# Patient Record
Sex: MALE | Race: WHITE | NOT HISPANIC OR LATINO | Employment: FULL TIME | URBAN - METROPOLITAN AREA
[De-identification: names, ages, dates, MRNs, and addresses within clinical notes are randomized per-mention and may not be internally consistent; named-entity substitution may affect disease eponyms.]

---

## 2017-02-28 ENCOUNTER — TELEPHONE (OUTPATIENT)
Dept: MEDICAL GROUP | Facility: MEDICAL CENTER | Age: 62
End: 2017-02-28

## 2017-02-28 NOTE — TELEPHONE ENCOUNTER
Phone Number Called: [phone] (home) [phone] (work)      Message: left message for patient to call back, patient did not leave reason for call    Left Message for patient to call back: yes

## 2017-03-01 RX ORDER — LEVOTHYROXINE SODIUM 88 UG/1
88 TABLET ORAL
Qty: 90 TAB | Refills: 3 | Status: SHIPPED | OUTPATIENT
Start: 2017-03-01 | End: 2022-01-01

## 2017-03-22 ENCOUNTER — TELEPHONE (OUTPATIENT)
Dept: MEDICAL GROUP | Facility: MEDICAL CENTER | Age: 62
End: 2017-03-22

## 2017-03-22 DIAGNOSIS — E78.5 HYPERLIPIDEMIA, UNSPECIFIED HYPERLIPIDEMIA TYPE: ICD-10-CM

## 2017-03-22 DIAGNOSIS — E87.6 HYPOKALEMIA: ICD-10-CM

## 2017-03-22 DIAGNOSIS — E03.9 HYPOTHYROIDISM, UNSPECIFIED TYPE: ICD-10-CM

## 2017-03-22 NOTE — TELEPHONE ENCOUNTER
1. Caller Name: Ramakrishna Ahuja                                  Call Back Number: [phone] (home) [phone] (work)        Patient approves a detailed voicemail message: N\A    Patient wanted to know if he needs any labs for his up coming appointment on 5/2017 ? Thank you Please advise

## 2017-03-23 NOTE — TELEPHONE ENCOUNTER
VOICEMAIL  1. Caller Name: Nurys                      Call Back Number: [phone]    2. Message: Left VM for the patient to call back to give doctor message regarding labs.    3. Patient approves office to leave a detailed voicemail/MyChart message: N\A

## 2017-03-28 NOTE — TELEPHONE ENCOUNTER
VOICEMAIL  1. Caller Name: Nurys                      Call Back Number: [phone]    2. Message: Left another VM for the patient to call back to give him the doctors message regarding labs, I have not had any response from the patient, I will try calling again later.    3. Patient approves office to leave a detailed voicemail/MyChart message: N\A

## 2017-03-31 NOTE — TELEPHONE ENCOUNTER
Phone Number Called: [phone]    Message: I have left several VM for the patient to call back to give doctor message regarding patients lab and to call back with any questions, I have had no responce no response from the patient.    Left Message for patient to call back: N\A

## 2017-05-03 ENCOUNTER — HOSPITAL ENCOUNTER (OUTPATIENT)
Dept: LAB | Facility: MEDICAL CENTER | Age: 62
End: 2017-05-03
Attending: INTERNAL MEDICINE
Payer: COMMERCIAL

## 2017-05-03 DIAGNOSIS — E87.6 HYPOKALEMIA: ICD-10-CM

## 2017-05-03 DIAGNOSIS — E03.9 HYPOTHYROIDISM, UNSPECIFIED TYPE: ICD-10-CM

## 2017-05-03 DIAGNOSIS — E78.5 HYPERLIPIDEMIA, UNSPECIFIED HYPERLIPIDEMIA TYPE: ICD-10-CM

## 2017-05-03 LAB
ANION GAP SERPL CALC-SCNC: 12 MMOL/L (ref 0–11.9)
BUN SERPL-MCNC: 16 MG/DL (ref 8–22)
CALCIUM SERPL-MCNC: 9.4 MG/DL (ref 8.5–10.5)
CHLORIDE SERPL-SCNC: 103 MMOL/L (ref 96–112)
CHOLEST SERPL-MCNC: 235 MG/DL (ref 100–199)
CO2 SERPL-SCNC: 29 MMOL/L (ref 20–33)
CREAT SERPL-MCNC: 1.15 MG/DL (ref 0.5–1.4)
GFR SERPL CREATININE-BSD FRML MDRD: >60 ML/MIN/1.73 M 2
GLUCOSE SERPL-MCNC: 99 MG/DL (ref 65–99)
HDLC SERPL-MCNC: 52 MG/DL
LDLC SERPL CALC-MCNC: 156 MG/DL
POTASSIUM SERPL-SCNC: 3.4 MMOL/L (ref 3.6–5.5)
SODIUM SERPL-SCNC: 144 MMOL/L (ref 135–145)
TRIGL SERPL-MCNC: 137 MG/DL (ref 0–149)
TSH SERPL DL<=0.005 MIU/L-ACNC: 2.38 UIU/ML (ref 0.3–3.7)

## 2017-05-03 PROCEDURE — 80061 LIPID PANEL: CPT

## 2017-05-03 PROCEDURE — 80048 BASIC METABOLIC PNL TOTAL CA: CPT

## 2017-05-03 PROCEDURE — 84443 ASSAY THYROID STIM HORMONE: CPT

## 2017-05-03 PROCEDURE — 36415 COLL VENOUS BLD VENIPUNCTURE: CPT

## 2017-05-05 ENCOUNTER — TELEPHONE (OUTPATIENT)
Dept: MEDICAL GROUP | Facility: MEDICAL CENTER | Age: 62
End: 2017-05-05

## 2017-05-05 NOTE — TELEPHONE ENCOUNTER
Future Appointments      Provider Department Center   5/8/2017 9:40 AM Teddy Villela M.D. [facility] [address]       ESTABLISHED PATIENT PRE-VISIT PLANNING     Note: Patient will not be contacted if there is no indication to call. PT was not Contacted.    1.    Reviewed note from last office visit with PCP: YES Last office visit: 11/29/16    2.  If any orders were placed at last visit, do we have Results/Consult Notes?        •  Labs - Labs ordered, completed and results are in chart. 05/03/17       •  Imaging - Imaging was not ordered at last office visit.        •  Referrals - No referrals were ordered at last office visit.     3.  Immunizations were updated in Epic using WebIZ?: No WebIZ record    4.  Patient is due for the following Health Maintenance Topics:   Health Maintenance Due   Topic Date Due   • IMM DTaP/Tdap/Td Vaccine (1 - Tdap) DUE   • COLONOSCOPY  DUE   • IMM ZOSTER VACCINE  DUE       5.  Patient was not informed to arrive 15 min prior to their scheduled appointment and bring in their medication bottles.

## 2017-05-08 ENCOUNTER — OFFICE VISIT (OUTPATIENT)
Dept: MEDICAL GROUP | Facility: MEDICAL CENTER | Age: 62
End: 2017-05-08
Payer: COMMERCIAL

## 2017-05-08 VITALS
TEMPERATURE: 98.6 F | HEART RATE: 84 BPM | WEIGHT: 180 LBS | DIASTOLIC BLOOD PRESSURE: 80 MMHG | HEIGHT: 70 IN | BODY MASS INDEX: 25.77 KG/M2 | RESPIRATION RATE: 16 BRPM | SYSTOLIC BLOOD PRESSURE: 126 MMHG | OXYGEN SATURATION: 94 %

## 2017-05-08 DIAGNOSIS — N52.9 ERECTILE DYSFUNCTION, UNSPECIFIED ERECTILE DYSFUNCTION TYPE: ICD-10-CM

## 2017-05-08 DIAGNOSIS — R53.83 FATIGUE, UNSPECIFIED TYPE: ICD-10-CM

## 2017-05-08 DIAGNOSIS — E78.5 HYPERLIPIDEMIA, UNSPECIFIED HYPERLIPIDEMIA TYPE: ICD-10-CM

## 2017-05-08 DIAGNOSIS — R07.9 CHEST PAIN, UNSPECIFIED TYPE: ICD-10-CM

## 2017-05-08 DIAGNOSIS — E87.6 HYPOKALEMIA: ICD-10-CM

## 2017-05-08 DIAGNOSIS — Z79.899 LONG TERM USE OF DRUG: ICD-10-CM

## 2017-05-08 DIAGNOSIS — E03.9 HYPOTHYROIDISM, UNSPECIFIED TYPE: ICD-10-CM

## 2017-05-08 PROCEDURE — 99214 OFFICE O/P EST MOD 30 MIN: CPT | Performed by: INTERNAL MEDICINE

## 2017-05-08 RX ORDER — PRAVASTATIN SODIUM 20 MG
20 TABLET ORAL EVERY EVENING
Qty: 90 TAB | Refills: 3 | Status: SHIPPED | OUTPATIENT
Start: 2017-05-08 | End: 2020-01-07

## 2017-05-08 NOTE — MR AVS SNAPSHOT
"        Ramakrishna Ahuja   2017 9:40 AM   Office Visit   MRN: [id number]    Department:  [address] [place] [address]   Dept Phone:  [phone]    Description:  Male : 1955   Provider:  Teddy Villela M.D.           Reason for Visit     Medication Management f/u thyroid      Allergies as of 2017     Allergen Noted Reactions    Penicillins 2008         You were diagnosed with     Hyperlipidemia, unspecified hyperlipidemia type   [9655797]       Long term use of drug   [933329]         Vital Signs     Blood Pressure Pulse Temperature Respirations Height Weight    126/80 mmHg 84 37 °C (98.6 °F) 16 1.778 m (5' 10\") 81.647 kg (180 lb)    Body Mass Index Oxygen Saturation Smoking Status             25.83 kg/m2 94% Never Smoker          Basic Information     Date Of Birth Sex Race Ethnicity Preferred Language    1955 Male White Non- English      Your appointments     Aug 29, 2017 10:00 AM   ANNUAL EXAM PREVENTATIVE with Teddy Villela M.D.   [facility] [address]    [address] [facility] [address]  [facility] [address]   [phone]              Problem List              ICD-10-CM Priority Class Noted - Resolved    Chest pain R07.9   2012 - Present    Abnormal ECG R94.31   2012 - Present    Bilateral inguinal hernia K40.20   2013 - Present    Erectile dysfunction N52.9   2016 - Present    Fatigue R53.83   2016 - Present    Routine general medical examination at a health care facility Z00.00   2016 - Present    Hypokalemia E87.6   2016 - Present    Hypothyroidism E03.9   2016 - Present    Hyperlipidemia E78.5   2016 - Present      Health Maintenance        Date Due Completion Dates    IMM DTaP/Tdap/Td Vaccine (1 - Tdap) 1974 ---    COLONOSCOPY 2005 ---    IMM ZOSTER VACCINE 2015 ---            Current Immunizations     No immunizations on file.      Below and/or attached are the medications your provider expects you " to take. Review all of your home medications and newly ordered medications with your provider and/or pharmacist. Follow medication instructions as directed by your provider and/or pharmacist. Please keep your medication list with you and share with your provider. Update the information when medications are discontinued, doses are changed, or new medications (including over-the-counter products) are added; and carry medication information at all times in the event of emergency situations     Allergies:  PENICILLINS - (reactions not documented)               Medications  Valid as of: May 08, 2017 - 10:15 AM    Generic Name Brand Name Tablet Size Instructions for use    Ascorbic Acid (Cap) Vitamin C 500 MG Take  by mouth.        Aspirin (Tab) aspirin 81 MG QDAY.         Levothyroxine Sodium (Tab) SYNTHROID 88 MCG Take 1 Tab by mouth Every morning on an empty stomach.        Metaxalone (Tab) SKELAXIN 800 MG Take 1 Tab by mouth 3 times a day as needed. Do not take at work/driving/operating machinery if any side-effects.        MethylPREDNISolone (Kit) MEDROL DOSEPACK 4 MG As directed        Misc Natural Products (Tab) COMPLETE PROSTATE HEALTH  Take  by mouth.        Multiple Vitamin (Cap) multivitamin  Take  by mouth.        Pravastatin Sodium (Tab) PRAVACHOL 20 MG Take 1 Tab by mouth every evening.        Sildenafil Citrate (Tab) VIAGRA 50 MG Take 1 Tab by mouth as needed for Erectile Dysfunction.        .                 Medicines prescribed today were sent to:     [id number] [place] - [address] [place] [id number]    Phone: [phone] Fax: [phone]    Open 24 Hours?: No      Medication refill instructions:       If your prescription bottle indicates you have medication refills left, it is not necessary to call your provider’s office. Please contact your pharmacy and they will refill your medication.    If your prescription bottle indicates you do not have any  refills left, you may request refills at any time through one of the following ways: The online [employer] system (except Urgent Care), by calling your provider’s office, or by asking your pharmacy to contact your provider’s office with a refill request. Medication refills are processed only during regular business hours and may not be available until the next business day. Your provider may request additional information or to have a follow-up visit with you prior to refilling your medication.   *Please Note: Medication refills are assigned a new Rx number when refilled electronically. Your pharmacy may indicate that no refills were authorized even though a new prescription for the same medication is available at the pharmacy. Please request the medicine by name with the pharmacy before contacting your provider for a refill.        Your To Do List     Future Labs/Procedures Complete By Expires    HEPATIC FUNCTION PANEL  As directed 5/8/2018    LIPID PROFILE  As directed 5/9/2018         [employer] Status: Patient Declined

## 2017-05-08 NOTE — ASSESSMENT & PLAN NOTE
Most recent cholesterol 235, triglycerides 137, HDL 52, . 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

## 2017-05-08 NOTE — ASSESSMENT & PLAN NOTE
He continues levothyroxine without difficulty. TSH is normal at 2.3. He does have fatigue as noted. He otherwise is euthyroid.

## 2017-05-08 NOTE — ASSESSMENT & PLAN NOTE
His fatigue is about the same which he attributes to being under a lot of stress and working long hard hours.

## 2017-05-08 NOTE — ASSESSMENT & PLAN NOTE
A friend gave him some samples of 20 mg sildenafil. He has not tried taking them yet. He did not get the prescription for Viagra.

## 2017-05-08 NOTE — PROGRESS NOTES
Subjective:     Chief Complaint   Patient presents with   • Medication Management     f/u thyroid     Ramakrishna Ahuja is a 62 y.o. male here today for follow-up of his various health problems. Remains under a large amount of stress and he works long hours.    Chest pain  He denies recent chest pain.    Erectile dysfunction  A friend gave him some samples of 20 mg sildenafil. He has not tried taking them yet. He did not get the prescription for Viagra.    Fatigue  His fatigue is about the same which he attributes to being under a lot of stress and working long hard hours.    Hyperlipidemia  Most recent cholesterol 235, triglycerides 137, HDL 52, . 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    Hypokalemia  We reviewed foods high in potassium and he was encouraged in a low-salt diet.    Hypothyroidism  He continues levothyroxine without difficulty. TSH is normal at 2.3. He does have fatigue as noted. He otherwise is euthyroid.       Diagnoses of Hyperlipidemia, unspecified hyperlipidemia type, Long term use of drug, Erectile dysfunction, unspecified erectile dysfunction type, Fatigue, unspecified type, Hypothyroidism, unspecified type, Chest pain, unspecified type, and Hypokalemia were pertinent to this visit.    Allergies: Penicillins  Current medicines (including changes today)  Current Outpatient Prescriptions   Medication Sig Dispense Refill   • pravastatin (PRAVACHOL) 20 MG Tab Take 1 Tab by mouth every evening. 90 Tab 3   • levothyroxine (SYNTHROID) 88 MCG Tab Take 1 Tab by mouth Every morning on an empty stomach. 90 Tab 3   • Misc Natural Products (COMPLETE PROSTATE HEALTH) TABS Take  by mouth.     • Multiple Vitamin (MULTIVITAMIN) capsule Take  by mouth.     • Ascorbic Acid (VITAMIN C) 500 MG CAPS Take  by mouth.     • ASPIRIN 81 MG PO TABS QDAY.      • sildenafil citrate (VIAGRA) 50 MG tablet Take 1 Tab by mouth as needed for Erectile Dysfunction. 6 Tab 11   •  "methylPREDNISolone (MEDROL, MILY,) 4 MG tablet As directed 1 Kit 0   • metaxalone (SKELAXIN) 800 MG TABS Take 1 Tab by mouth 3 times a day as needed. Do not take at work/driving/operating machinery if any side-effects. 30 Tab 1     No current facility-administered medications for this visit.       He  has a past medical history of Murmur (1978); Angina; and Alcohol use.    ROS    Patient denies significant change in strength, weight or appetite.  No significant lightheadedness or headaches.  No change in vision, hearing, or swallowing.  No new dyspnea, coughing, chest pain, or palpitations.  No indigestion, abdominal pain, or change in bowel habits.  No change in urinating.  No new ankle swelling.       Objective:     PE:  /80 mmHg  Pulse 84  Temp(Src) 37 °C (98.6 °F)  Resp 16  Ht 1.778 m (5' 10\")  Wt 81.647 kg (180 lb)  BMI 25.83 kg/m2  SpO2 94%  Neck is supple without significant lymphadenopathy or masses.  Lungs are clear with normal breath sounds without wheezes or rales .  Cardiovascular: peripheral circulation is satisfactory, heart sounds are unchanged and unremarkable.  Abdomen is soft, without masses or tenderness, with normal bowel sounds.  Extremities are without significant edema, cyanosis or deformity.      Assessment and Plan:   The following treatment plan was discussed  1. Hyperlipidemia, unspecified hyperlipidemia type  LIPID PROFILE    We reviewed appropriate diet. We agreed to start him on pravastatin. We reviewed potential side effects.   2. Long term use of drug  HEPATIC FUNCTION PANEL   3. Erectile dysfunction, unspecified erectile dysfunction type      We again discussed conservative therapy of erectile dysfunction. We reviewed appropriate use of Viagra.   4. Fatigue, unspecified type      Probably closely related to his stress and long work hours.   5. Hypothyroidism, unspecified type      He will continue levothyroxin. We will check TSH at least yearly.   6. Chest pain, " unspecified type      He will report any significant further episodes of chest discomfort.   7. Hypokalemia      Low-salt diet and foods high in potassium.       Followup: Return in about 3 months (around 8/8/2017), or H&P, for Long.

## 2019-12-30 ENCOUNTER — TELEPHONE (OUTPATIENT)
Dept: MEDICAL GROUP | Facility: MEDICAL CENTER | Age: 64
End: 2019-12-30

## 2019-12-30 NOTE — TELEPHONE ENCOUNTER
Pt wanting a referral for a knee doctor but hasn't been seen since 2017. Can we reestablish him as a new patient?

## 2020-01-07 ENCOUNTER — OFFICE VISIT (OUTPATIENT)
Dept: MEDICAL GROUP | Facility: MEDICAL CENTER | Age: 65
End: 2020-01-07
Payer: COMMERCIAL

## 2020-01-07 VITALS
TEMPERATURE: 98.6 F | HEIGHT: 73 IN | WEIGHT: 174 LBS | DIASTOLIC BLOOD PRESSURE: 78 MMHG | HEART RATE: 101 BPM | OXYGEN SATURATION: 94 % | SYSTOLIC BLOOD PRESSURE: 130 MMHG | BODY MASS INDEX: 23.06 KG/M2

## 2020-01-07 DIAGNOSIS — M25.561 CHRONIC PAIN OF BOTH KNEES: ICD-10-CM

## 2020-01-07 DIAGNOSIS — G89.29 CHRONIC PAIN OF BOTH KNEES: ICD-10-CM

## 2020-01-07 DIAGNOSIS — E03.9 HYPOTHYROIDISM, UNSPECIFIED TYPE: ICD-10-CM

## 2020-01-07 DIAGNOSIS — E78.5 HYPERLIPIDEMIA, UNSPECIFIED HYPERLIPIDEMIA TYPE: ICD-10-CM

## 2020-01-07 DIAGNOSIS — M25.562 CHRONIC PAIN OF BOTH KNEES: ICD-10-CM

## 2020-01-07 PROCEDURE — 99213 OFFICE O/P EST LOW 20 MIN: CPT | Performed by: INTERNAL MEDICINE

## 2020-01-07 RX ORDER — PRAVASTATIN SODIUM 20 MG
20 TABLET ORAL EVERY EVENING
Qty: 90 TAB | Refills: 3 | Status: SHIPPED
Start: 2020-01-07 | End: 2020-01-07

## 2020-01-07 RX ORDER — PRAVASTATIN SODIUM 20 MG
20 TABLET ORAL EVERY EVENING
Qty: 90 TAB | Refills: 3 | Status: SHIPPED | OUTPATIENT
Start: 2020-01-07 | End: 2022-01-01 | Stop reason: SDUPTHER

## 2020-01-07 ASSESSMENT — PATIENT HEALTH QUESTIONNAIRE - PHQ9: CLINICAL INTERPRETATION OF PHQ2 SCORE: 0

## 2020-01-07 NOTE — ASSESSMENT & PLAN NOTE
He reports that when he was taking his levothyroxine that he felt jittery.  He stopped the medication and reports that he feels much better.  He thinks his energy level is good.

## 2020-01-07 NOTE — ASSESSMENT & PLAN NOTE
He has hyperlipidemia for which he has been on pravastatin.  For reasons not clear he stopped that medication several months ago.  He tries to follow appropriate diet.  He has not had his lipids checked recently.

## 2020-01-07 NOTE — ASSESSMENT & PLAN NOTE
He has chronic pain in both knees that has been gradually progressive over several years.  He played a lot of sports in school and had some knee injuries.  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  That aggravated his knee problems quite significantly.  He reports swelling of both knees and occasional locking or snapping or clicking.  He has quite a bit of pain in both knees.  Aleve does seem to help that at least somewhat.

## 2020-01-07 NOTE — PROGRESS NOTES
Subjective:     Chief Complaint   Patient presents with   • Referral Needed     Knees, right leg torn miniscus, swollen, pockets on both sides, pain     Ramakrishna Ahuja is a 64 y.o. male here today for evaluation especially of bilateral knee pain that is worse on the right.  He also is due for further evaluation and treatment of hyperlipidemia and hypothyroidism.    Hypothyroidism  He reports that when he was taking his levothyroxine that he felt jittery.  He stopped the medication and reports that he feels much better.  He thinks his energy level is good.    Hyperlipidemia  He has hyperlipidemia for which he has been on pravastatin.  For reasons not clear he stopped that medication several months ago.  He tries to follow appropriate diet.  He has not had his lipids checked recently.    Chronic pain of both knees  He has chronic pain in both knees that has been gradually progressive over several years.  He played a lot of sports in school and had some knee injuries.  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  That aggravated his knee problems quite significantly.  He reports swelling of both knees and occasional locking or snapping or clicking.  He has quite a bit of pain in both knees.  Aleve does seem to help that at least somewhat.       Diagnoses of Chronic pain of both knees, Hypothyroidism, unspecified type, and Hyperlipidemia, unspecified hyperlipidemia type were pertinent to this visit.    Allergies: Penicillins  Current medicines (including changes today)  Current Outpatient Medications   Medication Sig Dispense Refill   • pravastatin (PRAVACHOL) 20 MG Tab Take 1 Tab by mouth every evening. 90 Tab 3   • Misc Natural Products (COMPLETE PROSTATE HEALTH) TABS Take  by mouth.     • Multiple Vitamin (MULTIVITAMIN) capsule Take  by mouth.     • Ascorbic Acid (VITAMIN C) 500 MG CAPS Take  by mouth.     • ASPIRIN 81 MG PO TABS QDAY.      • levothyroxine (SYNTHROID) 88 MCG Tab Take 1 Tab by  "mouth Every morning on an empty stomach. (Patient not taking: Reported on 1/7/2020) 90 Tab 3   • sildenafil citrate (VIAGRA) 50 MG tablet Take 1 Tab by mouth as needed for Erectile Dysfunction. (Patient not taking: Reported on 1/7/2020) 6 Tab 11   • methylPREDNISolone (MEDROL, MILY,) 4 MG tablet As directed (Patient not taking: Reported on 1/7/2020) 1 Kit 0   • metaxalone (SKELAXIN) 800 MG TABS Take 1 Tab by mouth 3 times a day as needed. Do not take at work/driving/operating machinery if any side-effects. (Patient not taking: Reported on 1/7/2020) 30 Tab 1     No current facility-administered medications for this visit.        He  has a past medical history of Alcohol use, Angina, and Murmur (1978).    ROS    Patient denies significant change in strength, weight or appetite.  No significant lightheadedness or headaches.  No change in vision, hearing, or swallowing.  No new dyspnea, coughing, chest pain, or palpitations.  No indigestion, abdominal pain, or change in bowel habits.  No change in urinating.  No new ankle swelling.       Objective:     PE:  /78 (BP Location: Left arm, Patient Position: Sitting)   Pulse (!) 101   Temp 37 °C (98.6 °F)   Ht 1.854 m (6' 1\")   Wt 78.9 kg (174 lb)   SpO2 94%   BMI 22.96 kg/m²   Abdomen is soft, without masses or tenderness, with normal bowel sounds.  Extremities are without significant edema, cyanosis or deformity.  He has bilateral knee effusions.  Ligaments seem intact.  I cannot elicit any locking or clicking or snapping at this time.      Assessment and Plan:   The following treatment plan was discussed  1. Chronic pain of both knees  REFERRAL TO ORTHOPEDICS    We reviewed exercises for his knees.  I put in a referral for orthopedics and we discussed options in treatment.   2. Hypothyroidism, unspecified type  TSH    We will check a TSH and evaluate or treat further as indicated.   3. Hyperlipidemia, unspecified hyperlipidemia type  Lipid Profile    He will " restart the pravastatin, then we will recheck a lipid panel.  We briefly reviewed appropriate diet.       Followup: Return in about 3 months (around 4/7/2020) for Short.

## 2020-02-27 ENCOUNTER — TELEPHONE (OUTPATIENT)
Dept: MEDICAL GROUP | Facility: MEDICAL CENTER | Age: 65
End: 2020-02-27

## 2020-02-27 NOTE — TELEPHONE ENCOUNTER
Pt called and said his knees are still hurting and is wondering if he can take anything over the counter for it as his cartilage is very thin.  He said he went to the orthopedic surgeon and they wanted to shoot him with stuff but he said he doesn't want to do that right now.

## 2020-02-28 NOTE — TELEPHONE ENCOUNTER
Message was left to try glucosamine sulfate for 1 to 2 weeks at least and otherwise he could try Aleve 2 pills twice a day as long as he is not taking an anti-inflammatory medication.

## 2020-03-25 ENCOUNTER — TELEPHONE (OUTPATIENT)
Dept: MEDICAL GROUP | Facility: MEDICAL CENTER | Age: 65
End: 2020-03-25

## 2020-03-26 ENCOUNTER — TELEPHONE (OUTPATIENT)
Dept: MEDICAL GROUP | Facility: MEDICAL CENTER | Age: 65
End: 2020-03-26

## 2020-03-26 DIAGNOSIS — M25.561 CHRONIC PAIN OF BOTH KNEES: ICD-10-CM

## 2020-03-26 DIAGNOSIS — G89.29 CHRONIC PAIN OF BOTH KNEES: ICD-10-CM

## 2020-03-26 DIAGNOSIS — M25.562 CHRONIC PAIN OF BOTH KNEES: ICD-10-CM

## 2020-03-26 NOTE — TELEPHONE ENCOUNTER
Spoke with patient and he mentioned he wants to see another office other then the [facility] and if you have a doctor you can refer him to he will go there also. He didn't care for the [facility]

## 2021-03-03 DIAGNOSIS — Z23 NEED FOR VACCINATION: ICD-10-CM

## 2022-01-01 ENCOUNTER — APPOINTMENT (OUTPATIENT)
Dept: CARDIOLOGY | Facility: MEDICAL CENTER | Age: 67
DRG: 215 | End: 2022-01-01
Attending: INTERNAL MEDICINE
Payer: MEDICARE

## 2022-01-01 ENCOUNTER — APPOINTMENT (OUTPATIENT)
Dept: RADIOLOGY | Facility: MEDICAL CENTER | Age: 67
DRG: 215 | End: 2022-01-01
Payer: MEDICARE

## 2022-01-01 ENCOUNTER — APPOINTMENT (OUTPATIENT)
Dept: MEDICAL GROUP | Facility: MEDICAL CENTER | Age: 67
End: 2022-01-01
Payer: MEDICARE

## 2022-01-01 ENCOUNTER — APPOINTMENT (OUTPATIENT)
Dept: CARDIOLOGY | Facility: MEDICAL CENTER | Age: 67
DRG: 215 | End: 2022-01-01
Attending: EMERGENCY MEDICINE
Payer: MEDICARE

## 2022-01-01 ENCOUNTER — APPOINTMENT (OUTPATIENT)
Dept: RADIOLOGY | Facility: MEDICAL CENTER | Age: 67
DRG: 215 | End: 2022-01-01
Attending: INTERNAL MEDICINE
Payer: MEDICARE

## 2022-01-01 ENCOUNTER — OFFICE VISIT (OUTPATIENT)
Dept: MEDICAL GROUP | Facility: MEDICAL CENTER | Age: 67
End: 2022-01-01
Payer: MEDICARE

## 2022-01-01 ENCOUNTER — APPOINTMENT (OUTPATIENT)
Dept: RADIOLOGY | Facility: MEDICAL CENTER | Age: 67
DRG: 215 | End: 2022-01-01
Attending: EMERGENCY MEDICINE
Payer: MEDICARE

## 2022-01-01 ENCOUNTER — TELEPHONE (OUTPATIENT)
Dept: HEALTH INFORMATION MANAGEMENT | Facility: OTHER | Age: 67
End: 2022-01-01
Payer: MEDICARE

## 2022-01-01 ENCOUNTER — HOSPITAL ENCOUNTER (INPATIENT)
Facility: MEDICAL CENTER | Age: 67
LOS: 1 days | DRG: 215 | End: 2022-12-30
Attending: EMERGENCY MEDICINE | Admitting: INTERNAL MEDICINE
Payer: MEDICARE

## 2022-01-01 VITALS
DIASTOLIC BLOOD PRESSURE: 51 MMHG | OXYGEN SATURATION: 78 % | BODY MASS INDEX: 25.4 KG/M2 | SYSTOLIC BLOOD PRESSURE: 73 MMHG | TEMPERATURE: 91 F | HEIGHT: 70 IN | RESPIRATION RATE: 32 BRPM

## 2022-01-01 VITALS
DIASTOLIC BLOOD PRESSURE: 86 MMHG | TEMPERATURE: 98.6 F | HEIGHT: 73 IN | HEART RATE: 97 BPM | WEIGHT: 177 LBS | OXYGEN SATURATION: 96 % | BODY MASS INDEX: 23.46 KG/M2 | SYSTOLIC BLOOD PRESSURE: 136 MMHG

## 2022-01-01 DIAGNOSIS — E03.9 HYPOTHYROIDISM, UNSPECIFIED TYPE: ICD-10-CM

## 2022-01-01 DIAGNOSIS — G89.29 CHRONIC PAIN OF BOTH KNEES: ICD-10-CM

## 2022-01-01 DIAGNOSIS — E78.5 HYPERLIPIDEMIA, UNSPECIFIED HYPERLIPIDEMIA TYPE: ICD-10-CM

## 2022-01-01 DIAGNOSIS — J96.01 ACUTE RESPIRATORY FAILURE WITH HYPOXIA (HCC): ICD-10-CM

## 2022-01-01 DIAGNOSIS — M25.562 CHRONIC PAIN OF BOTH KNEES: ICD-10-CM

## 2022-01-01 DIAGNOSIS — I21.3 ST ELEVATION MYOCARDIAL INFARCTION (STEMI), UNSPECIFIED ARTERY (HCC): ICD-10-CM

## 2022-01-01 DIAGNOSIS — M25.561 CHRONIC PAIN OF BOTH KNEES: ICD-10-CM

## 2022-01-01 DIAGNOSIS — R53.83 FATIGUE, UNSPECIFIED TYPE: ICD-10-CM

## 2022-01-01 DIAGNOSIS — Z12.5 PROSTATE CANCER SCREENING: ICD-10-CM

## 2022-01-01 DIAGNOSIS — E87.6 HYPOKALEMIA: ICD-10-CM

## 2022-01-01 DIAGNOSIS — I46.9 CARDIAC ARREST (HCC): ICD-10-CM

## 2022-01-01 DIAGNOSIS — R57.0 CARDIOGENIC SHOCK (HCC): ICD-10-CM

## 2022-01-01 LAB
ABO + RH BLD: NORMAL
ABO GROUP BLD: NORMAL
ACT BLD: 173 SEC (ref 74–137)
ACT BLD: 173 SEC (ref 74–137)
ACT BLD: 179 SEC (ref 74–137)
ACT BLD: 209 SEC (ref 74–137)
ACT BLD: 239 SEC (ref 74–137)
ACT BLD: 335 SEC (ref 74–137)
ALBUMIN SERPL BCP-MCNC: 1.6 G/DL (ref 3.2–4.9)
ALBUMIN SERPL BCP-MCNC: 2.5 G/DL (ref 3.2–4.9)
ALBUMIN SERPL BCP-MCNC: 3.5 G/DL (ref 3.2–4.9)
ALBUMIN SERPL BCP-MCNC: 4.1 G/DL (ref 3.2–4.9)
ALBUMIN/GLOB SERPL: 1 G/DL
ALBUMIN/GLOB SERPL: 1.3 G/DL
ALBUMIN/GLOB SERPL: 1.3 G/DL
ALBUMIN/GLOB SERPL: ABNORMAL G/DL
ALP SERPL-CCNC: 34 U/L (ref 30–99)
ALP SERPL-CCNC: 47 U/L (ref 30–99)
ALP SERPL-CCNC: 62 U/L (ref 30–99)
ALP SERPL-CCNC: 63 U/L (ref 30–99)
ALT SERPL-CCNC: 110 U/L (ref 2–50)
ALT SERPL-CCNC: 178 U/L (ref 2–50)
ALT SERPL-CCNC: 231 U/L (ref 2–50)
ALT SERPL-CCNC: 29 U/L (ref 2–50)
ANION GAP SERPL CALC-SCNC: 17 MMOL/L (ref 7–16)
ANION GAP SERPL CALC-SCNC: 18 MMOL/L (ref 7–16)
ANION GAP SERPL CALC-SCNC: 19 MMOL/L (ref 7–16)
ANION GAP SERPL CALC-SCNC: 26 MMOL/L (ref 7–16)
ANION GAP SERPL CALC-SCNC: 29 MMOL/L (ref 7–16)
APTT PPP: 146.8 SEC (ref 24.7–36)
APTT PPP: >240 SEC (ref 24.7–36)
APTT PPP: >240 SEC (ref 24.7–36)
AST SERPL-CCNC: 278 U/L (ref 12–45)
AST SERPL-CCNC: 285 U/L (ref 12–45)
AST SERPL-CCNC: 29 U/L (ref 12–45)
AST SERPL-CCNC: 437 U/L (ref 12–45)
BARCODED ABORH UBTYP: 2800
BARCODED ABORH UBTYP: 5100
BARCODED ABORH UBTYP: 6200
BARCODED ABORH UBTYP: 7300
BARCODED ABORH UBTYP: 9500
BARCODED PRD CODE UBPRD: NORMAL
BARCODED UNIT NUM UBUNT: NORMAL
BASE EXCESS BLDA CALC-SCNC: -14 MMOL/L (ref -4–3)
BASE EXCESS BLDA CALC-SCNC: -16 MMOL/L (ref -4–3)
BASE EXCESS BLDA CALC-SCNC: -18 MMOL/L (ref -4–3)
BASE EXCESS BLDA CALC-SCNC: -6 MMOL/L (ref -4–3)
BASE EXCESS BLDV CALC-SCNC: -20 MMOL/L (ref -4–3)
BASOPHILS # BLD AUTO: 0 % (ref 0–1.8)
BASOPHILS # BLD AUTO: 0.3 % (ref 0–1.8)
BASOPHILS # BLD AUTO: 0.5 % (ref 0–1.8)
BASOPHILS # BLD: 0 K/UL (ref 0–0.12)
BASOPHILS # BLD: 0.06 K/UL (ref 0–0.12)
BASOPHILS # BLD: 0.11 K/UL (ref 0–0.12)
BILIRUB SERPL-MCNC: 0.6 MG/DL (ref 0.1–1.5)
BILIRUB SERPL-MCNC: 0.7 MG/DL (ref 0.1–1.5)
BILIRUB SERPL-MCNC: 0.8 MG/DL (ref 0.1–1.5)
BILIRUB SERPL-MCNC: 1.1 MG/DL (ref 0.1–1.5)
BLD GP AB SCN SERPL QL: NORMAL
BODY TEMPERATURE: ABNORMAL DEGREES
BREATHS SETTING VENT: 28
BUN SERPL-MCNC: 18 MG/DL (ref 8–22)
BUN SERPL-MCNC: 18 MG/DL (ref 8–22)
BUN SERPL-MCNC: 19 MG/DL (ref 8–22)
BUN SERPL-MCNC: 19 MG/DL (ref 8–22)
BUN SERPL-MCNC: 20 MG/DL (ref 8–22)
CA-I BLD ISE-SCNC: 1.02 MMOL/L (ref 1.1–1.3)
CA-I SERPL-SCNC: 1 MMOL/L (ref 1.1–1.3)
CALCIUM ALBUM COR SERPL-MCNC: 10.6 MG/DL (ref 8.5–10.5)
CALCIUM ALBUM COR SERPL-MCNC: 8 MG/DL (ref 8.5–10.5)
CALCIUM ALBUM COR SERPL-MCNC: 8.7 MG/DL (ref 8.5–10.5)
CALCIUM ALBUM COR SERPL-MCNC: 8.7 MG/DL (ref 8.5–10.5)
CALCIUM SERPL-MCNC: 7.5 MG/DL (ref 8.5–10.5)
CALCIUM SERPL-MCNC: 7.6 MG/DL (ref 8.5–10.5)
CALCIUM SERPL-MCNC: 8.6 MG/DL (ref 8.5–10.5)
CALCIUM SERPL-MCNC: 8.7 MG/DL (ref 8.5–10.5)
CALCIUM SERPL-MCNC: 8.8 MG/DL (ref 8.5–10.5)
CFT BLD TEG: >17 MIN (ref 4.6–9.1)
CFT P HPASE BLD TEG: 8.8 MIN (ref 4.3–8.3)
CHLORIDE SERPL-SCNC: 100 MMOL/L (ref 96–112)
CHLORIDE SERPL-SCNC: 100 MMOL/L (ref 96–112)
CHLORIDE SERPL-SCNC: 103 MMOL/L (ref 96–112)
CHLORIDE SERPL-SCNC: 105 MMOL/L (ref 96–112)
CHLORIDE SERPL-SCNC: 106 MMOL/L (ref 96–112)
CHOLEST SERPL-MCNC: 206 MG/DL (ref 100–199)
CLOT ANGLE BLD TEG: 50.2 DEGREES (ref 63–78)
CLOT LYSIS 30M P MA LENFR BLD TEG: ABNORMAL % (ref 0–2.6)
CO2 BLDA-SCNC: 12 MMOL/L (ref 20–33)
CO2 BLDA-SCNC: 13 MMOL/L (ref 20–33)
CO2 BLDA-SCNC: 14 MMOL/L (ref 20–33)
CO2 BLDA-SCNC: 21 MMOL/L (ref 20–33)
CO2 BLDV-SCNC: 12 MMOL/L (ref 20–33)
CO2 SERPL-SCNC: 10 MMOL/L (ref 20–33)
CO2 SERPL-SCNC: 12 MMOL/L (ref 20–33)
CO2 SERPL-SCNC: 17 MMOL/L (ref 20–33)
CO2 SERPL-SCNC: 19 MMOL/L (ref 20–33)
CO2 SERPL-SCNC: 20 MMOL/L (ref 20–33)
COMPONENT F 8504F: NORMAL
COMPONENT P 8504P: NORMAL
COMPONENT R 8504R: NORMAL
CREAT SERPL-MCNC: 1.13 MG/DL (ref 0.5–1.4)
CREAT SERPL-MCNC: 1.29 MG/DL (ref 0.5–1.4)
CREAT SERPL-MCNC: 1.64 MG/DL (ref 0.5–1.4)
CREAT SERPL-MCNC: 1.69 MG/DL (ref 0.5–1.4)
CREAT SERPL-MCNC: 1.96 MG/DL (ref 0.5–1.4)
CT.EXTRINSIC BLD ROTEM: ABNORMAL MIN (ref 0.8–2.1)
DELSYS IDSYS: ABNORMAL
EKG IMPRESSION: NORMAL
EKG IMPRESSION: NORMAL
EOSINOPHIL # BLD AUTO: 0.01 K/UL (ref 0–0.51)
EOSINOPHIL # BLD AUTO: 0.06 K/UL (ref 0–0.51)
EOSINOPHIL # BLD AUTO: 0.09 K/UL (ref 0–0.51)
EOSINOPHIL NFR BLD: 0.1 % (ref 0–6.9)
EOSINOPHIL NFR BLD: 0.2 % (ref 0–6.9)
EOSINOPHIL NFR BLD: 0.8 % (ref 0–6.9)
ERYTHROCYTE [DISTWIDTH] IN BLOOD BY AUTOMATED COUNT: 43.1 FL (ref 35.9–50)
ERYTHROCYTE [DISTWIDTH] IN BLOOD BY AUTOMATED COUNT: 43.8 FL (ref 35.9–50)
ERYTHROCYTE [DISTWIDTH] IN BLOOD BY AUTOMATED COUNT: 45.5 FL (ref 35.9–50)
ERYTHROCYTE [DISTWIDTH] IN BLOOD BY AUTOMATED COUNT: 47.8 FL (ref 35.9–50)
EST. AVERAGE GLUCOSE BLD GHB EST-MCNC: 114 MG/DL
GFR SERPLBLD CREATININE-BSD FMLA CKD-EPI: 37 ML/MIN/1.73 M 2
GFR SERPLBLD CREATININE-BSD FMLA CKD-EPI: 44 ML/MIN/1.73 M 2
GFR SERPLBLD CREATININE-BSD FMLA CKD-EPI: 45 ML/MIN/1.73 M 2
GFR SERPLBLD CREATININE-BSD FMLA CKD-EPI: 61 ML/MIN/1.73 M 2
GFR SERPLBLD CREATININE-BSD FMLA CKD-EPI: 71 ML/MIN/1.73 M 2
GLOBULIN SER CALC-MCNC: 2.4 G/DL (ref 1.9–3.5)
GLOBULIN SER CALC-MCNC: 2.8 G/DL (ref 1.9–3.5)
GLOBULIN SER CALC-MCNC: 3.1 G/DL (ref 1.9–3.5)
GLOBULIN SER CALC-MCNC: ABNORMAL G/DL (ref 1.9–3.5)
GLUCOSE BLD STRIP.AUTO-MCNC: 239 MG/DL (ref 65–99)
GLUCOSE BLD STRIP.AUTO-MCNC: 240 MG/DL (ref 65–99)
GLUCOSE BLD STRIP.AUTO-MCNC: 351 MG/DL (ref 65–99)
GLUCOSE BLD STRIP.AUTO-MCNC: 382 MG/DL (ref 65–99)
GLUCOSE BLD STRIP.AUTO-MCNC: 384 MG/DL (ref 65–99)
GLUCOSE BLD STRIP.AUTO-MCNC: 401 MG/DL (ref 65–99)
GLUCOSE BLD STRIP.AUTO-MCNC: 401 MG/DL (ref 65–99)
GLUCOSE BLD STRIP.AUTO-MCNC: 407 MG/DL (ref 65–99)
GLUCOSE BLD STRIP.AUTO-MCNC: 411 MG/DL (ref 65–99)
GLUCOSE BLD STRIP.AUTO-MCNC: <10 MG/DL (ref 65–99)
GLUCOSE SERPL-MCNC: 127 MG/DL (ref 65–99)
GLUCOSE SERPL-MCNC: 295 MG/DL (ref 65–99)
GLUCOSE SERPL-MCNC: 441 MG/DL (ref 65–99)
GLUCOSE SERPL-MCNC: 478 MG/DL (ref 65–99)
GLUCOSE SERPL-MCNC: 485 MG/DL (ref 65–99)
HBA1C MFR BLD: 5.6 % (ref 4–5.6)
HCO3 BLDA-SCNC: 10.5 MMOL/L (ref 17–25)
HCO3 BLDA-SCNC: 12.3 MMOL/L (ref 17–25)
HCO3 BLDA-SCNC: 13.4 MMOL/L (ref 17–25)
HCO3 BLDA-SCNC: 19.6 MMOL/L (ref 17–25)
HCO3 BLDV-SCNC: 10.5 MMOL/L (ref 24–28)
HCT VFR BLD AUTO: 20 % (ref 42–52)
HCT VFR BLD AUTO: 31 % (ref 42–52)
HCT VFR BLD AUTO: 41.7 % (ref 42–52)
HCT VFR BLD AUTO: 44.3 % (ref 42–52)
HDLC SERPL-MCNC: 41 MG/DL
HGB BLD-MCNC: 10.4 G/DL (ref 14–18)
HGB BLD-MCNC: 14.1 G/DL (ref 14–18)
HGB BLD-MCNC: 15 G/DL (ref 14–18)
HGB BLD-MCNC: 6.2 G/DL (ref 14–18)
HOROWITZ INDEX BLDA+IHG-RTO: 103 MM[HG]
HOROWITZ INDEX BLDA+IHG-RTO: 135 MM[HG]
HOROWITZ INDEX BLDA+IHG-RTO: 292 MM[HG]
HOROWITZ INDEX BLDA+IHG-RTO: 73 MM[HG]
IMM GRANULOCYTES # BLD AUTO: 0.27 K/UL (ref 0–0.11)
IMM GRANULOCYTES # BLD AUTO: 0.38 K/UL (ref 0–0.11)
IMM GRANULOCYTES NFR BLD AUTO: 1.1 % (ref 0–0.9)
IMM GRANULOCYTES NFR BLD AUTO: 2 % (ref 0–0.9)
INR PPP: 1.41 (ref 0.87–1.13)
INR PPP: 1.42 (ref 0.87–1.13)
INR PPP: 2.09 (ref 0.87–1.13)
LACTATE BLD-SCNC: 11.5 MMOL/L (ref 0.5–2)
LACTATE BLD-SCNC: 14.5 MMOL/L (ref 0.5–2)
LACTATE BLD-SCNC: 4 MMOL/L (ref 0.5–2)
LACTATE SERPL-SCNC: 16.1 MMOL/L (ref 0.5–2)
LACTATE SERPL-SCNC: 17.7 MMOL/L (ref 0.5–2)
LACTATE SERPL-SCNC: 4.7 MMOL/L (ref 0.5–2)
LACTATE SERPL-SCNC: 9.6 MMOL/L (ref 0.5–2)
LDH SERPL L TO P-CCNC: 711 U/L (ref 107–266)
LDLC SERPL CALC-MCNC: 143 MG/DL
LIPASE SERPL-CCNC: 35 U/L (ref 11–82)
LYMPHOCYTES # BLD AUTO: 2.61 K/UL (ref 1–4.8)
LYMPHOCYTES # BLD AUTO: 3.91 K/UL (ref 1–4.8)
LYMPHOCYTES # BLD AUTO: 7.05 K/UL (ref 1–4.8)
LYMPHOCYTES NFR BLD: 13.4 % (ref 22–41)
LYMPHOCYTES NFR BLD: 16.1 % (ref 22–41)
LYMPHOCYTES NFR BLD: 60.8 % (ref 22–41)
MAGNESIUM SERPL-MCNC: 1.5 MG/DL (ref 1.5–2.5)
MAGNESIUM SERPL-MCNC: 2.9 MG/DL (ref 1.5–2.5)
MAGNESIUM SERPL-MCNC: 3 MG/DL (ref 1.5–2.5)
MAGNESIUM SERPL-MCNC: 3 MG/DL (ref 1.5–2.5)
MANUAL DIFF BLD: NORMAL
MCF BLD TEG: <40 MM (ref 52–69)
MCF.PLATELET INHIB BLD ROTEM: <4 MM (ref 15–32)
MCH RBC QN AUTO: 31.1 PG (ref 27–33)
MCH RBC QN AUTO: 31.2 PG (ref 27–33)
MCH RBC QN AUTO: 31.5 PG (ref 27–33)
MCH RBC QN AUTO: 32.2 PG (ref 27–33)
MCHC RBC AUTO-ENTMCNC: 31 G/DL (ref 33.7–35.3)
MCHC RBC AUTO-ENTMCNC: 33.5 G/DL (ref 33.7–35.3)
MCHC RBC AUTO-ENTMCNC: 33.8 G/DL (ref 33.7–35.3)
MCHC RBC AUTO-ENTMCNC: 33.9 G/DL (ref 33.7–35.3)
MCV RBC AUTO: 100.5 FL (ref 81.4–97.8)
MCV RBC AUTO: 91.9 FL (ref 81.4–97.8)
MCV RBC AUTO: 93.1 FL (ref 81.4–97.8)
MCV RBC AUTO: 96 FL (ref 81.4–97.8)
MODE IMODE: ABNORMAL
MONOCYTES # BLD AUTO: 0.56 K/UL (ref 0–0.85)
MONOCYTES # BLD AUTO: 0.64 K/UL (ref 0–0.85)
MONOCYTES # BLD AUTO: 1.01 K/UL (ref 0–0.85)
MONOCYTES NFR BLD AUTO: 3.3 % (ref 0–13.4)
MONOCYTES NFR BLD AUTO: 4.2 % (ref 0–13.4)
MONOCYTES NFR BLD AUTO: 4.8 % (ref 0–13.4)
MORPHOLOGY BLD-IMP: NORMAL
NEUTROPHILS # BLD AUTO: 15.75 K/UL (ref 1.82–7.42)
NEUTROPHILS # BLD AUTO: 18.95 K/UL (ref 1.82–7.42)
NEUTROPHILS # BLD AUTO: 3.9 K/UL (ref 1.82–7.42)
NEUTROPHILS NFR BLD: 33.6 % (ref 44–72)
NEUTROPHILS NFR BLD: 77.9 % (ref 44–72)
NEUTROPHILS NFR BLD: 80.9 % (ref 44–72)
NRBC # BLD AUTO: 0 K/UL
NRBC BLD-RTO: 0 /100 WBC
NT-PROBNP SERPL IA-MCNC: 56 PG/ML (ref 0–125)
O2/TOTAL GAS SETTING VFR VENT: 100 %
O2/TOTAL GAS SETTING VFR VENT: 60 %
PA AA BLD-ACNC: 95.4 % (ref 0–11)
PA ADP BLD-ACNC: 94.2 % (ref 0–17)
PCO2 BLDA: 36.8 MMHG (ref 26–37)
PCO2 BLDA: 37 MMHG (ref 26–37)
PCO2 BLDA: 40 MMHG (ref 26–37)
PCO2 BLDA: 40.1 MMHG (ref 26–37)
PCO2 BLDV: 43.3 MMHG (ref 41–51)
PCO2 TEMP ADJ BLDA: 30.2 MMHG (ref 26–37)
PCO2 TEMP ADJ BLDA: 31.7 MMHG (ref 26–37)
PCO2 TEMP ADJ BLDA: 31.8 MMHG (ref 26–37)
PCO2 TEMP ADJ BLDA: 37.8 MMHG (ref 26–37)
PEEP END EXPIRATORY PRESSURE IPEEP: 10 CMH20
PH BLDA: 7.03 [PH] (ref 7.4–7.5)
PH BLDA: 7.13 [PH] (ref 7.4–7.5)
PH BLDA: 7.17 [PH] (ref 7.4–7.5)
PH BLDA: 7.3 [PH] (ref 7.4–7.5)
PH BLDV: 6.99 [PH] (ref 7.31–7.45)
PH TEMP ADJ BLDA: 7.09 [PH] (ref 7.4–7.5)
PH TEMP ADJ BLDA: 7.19 [PH] (ref 7.4–7.5)
PH TEMP ADJ BLDA: 7.21 [PH] (ref 7.4–7.5)
PH TEMP ADJ BLDA: 7.32 [PH] (ref 7.4–7.5)
PHOSPHATE SERPL-MCNC: 4.4 MG/DL (ref 2.5–4.5)
PHOSPHATE SERPL-MCNC: 4.6 MG/DL (ref 2.5–4.5)
PLATELET # BLD AUTO: 159 K/UL (ref 164–446)
PLATELET # BLD AUTO: 255 K/UL (ref 164–446)
PLATELET # BLD AUTO: 310 K/UL (ref 164–446)
PLATELET # BLD AUTO: 338 K/UL (ref 164–446)
PLATELET BLD QL SMEAR: NORMAL
PMV BLD AUTO: 9.6 FL (ref 9–12.9)
PMV BLD AUTO: 9.6 FL (ref 9–12.9)
PMV BLD AUTO: 9.8 FL (ref 9–12.9)
PMV BLD AUTO: 9.9 FL (ref 9–12.9)
PO2 BLDA: 103 MMHG (ref 64–87)
PO2 BLDA: 292 MMHG (ref 64–87)
PO2 BLDA: 73 MMHG (ref 64–87)
PO2 BLDA: 81 MMHG (ref 64–87)
PO2 BLDV: 34 MMHG (ref 25–40)
PO2 TEMP ADJ BLDA: 285 MMHG (ref 64–87)
PO2 TEMP ADJ BLDA: 51 MMHG (ref 64–87)
PO2 TEMP ADJ BLDA: 59 MMHG (ref 64–87)
PO2 TEMP ADJ BLDA: 85 MMHG (ref 64–87)
POTASSIUM BLD-SCNC: 2.5 MMOL/L (ref 3.6–5.5)
POTASSIUM SERPL-SCNC: 2.5 MMOL/L (ref 3.6–5.5)
POTASSIUM SERPL-SCNC: 2.7 MMOL/L (ref 3.6–5.5)
POTASSIUM SERPL-SCNC: 2.9 MMOL/L (ref 3.6–5.5)
POTASSIUM SERPL-SCNC: 3 MMOL/L (ref 3.6–5.5)
POTASSIUM SERPL-SCNC: 3.4 MMOL/L (ref 3.6–5.5)
PRODUCT TYPE UPROD: NORMAL
PROT SERPL-MCNC: 2.9 G/DL (ref 6–8.2)
PROT SERPL-MCNC: 4.9 G/DL (ref 6–8.2)
PROT SERPL-MCNC: 6.3 G/DL (ref 6–8.2)
PROT SERPL-MCNC: 7.2 G/DL (ref 6–8.2)
PROTHROMBIN TIME: 17 SEC (ref 12–14.6)
PROTHROMBIN TIME: 17.1 SEC (ref 12–14.6)
PROTHROMBIN TIME: 22.9 SEC (ref 12–14.6)
RBC # BLD AUTO: 1.99 M/UL (ref 4.7–6.1)
RBC # BLD AUTO: 3.23 M/UL (ref 4.7–6.1)
RBC # BLD AUTO: 4.48 M/UL (ref 4.7–6.1)
RBC # BLD AUTO: 4.82 M/UL (ref 4.7–6.1)
RBC BLD AUTO: NORMAL
RH BLD: NORMAL
SAO2 % BLDA: 100 % (ref 93–99)
SAO2 % BLDA: 85 % (ref 93–99)
SAO2 % BLDA: 91 % (ref 93–99)
SAO2 % BLDA: 96 % (ref 93–99)
SAO2 % BLDV: 39 %
SODIUM BLD-SCNC: 141 MMOL/L (ref 135–145)
SODIUM SERPL-SCNC: 135 MMOL/L (ref 135–145)
SODIUM SERPL-SCNC: 139 MMOL/L (ref 135–145)
SODIUM SERPL-SCNC: 141 MMOL/L (ref 135–145)
SODIUM SERPL-SCNC: 142 MMOL/L (ref 135–145)
SODIUM SERPL-SCNC: 144 MMOL/L (ref 135–145)
SPECIMEN DRAWN FROM PATIENT: ABNORMAL
T3FREE SERPL-MCNC: 2.93 PG/ML (ref 2–4.4)
TEG ALGORITHM TGALG: ABNORMAL
TIDAL VOLUME IVT: 440 ML
TRIGL SERPL-MCNC: 111 MG/DL (ref 0–149)
TROPONIN T SERPL-MCNC: 11 NG/L (ref 6–19)
TROPONIN T SERPL-MCNC: 1271 NG/L (ref 6–19)
TROPONIN T SERPL-MCNC: 3864 NG/L (ref 6–19)
TROPONIN T SERPL-MCNC: 9698 NG/L (ref 6–19)
TROPONIN T SERPL-MCNC: ABNORMAL NG/L (ref 6–19)
TSH SERPL DL<=0.005 MIU/L-ACNC: 11.2 UIU/ML (ref 0.38–5.33)
UNIT STATUS USTAT: NORMAL
WBC # BLD AUTO: 11.6 K/UL (ref 4.8–10.8)
WBC # BLD AUTO: 19.5 K/UL (ref 4.8–10.8)
WBC # BLD AUTO: 22.8 K/UL (ref 4.8–10.8)
WBC # BLD AUTO: 24.3 K/UL (ref 4.8–10.8)

## 2022-01-01 PROCEDURE — 85007 BL SMEAR W/DIFF WBC COUNT: CPT

## 2022-01-01 PROCEDURE — 92941 PRQ TRLML REVSC TOT OCCL AMI: CPT | Mod: LD | Performed by: INTERNAL MEDICINE

## 2022-01-01 PROCEDURE — 86900 BLOOD TYPING SEROLOGIC ABO: CPT

## 2022-01-01 PROCEDURE — 93458 L HRT ARTERY/VENTRICLE ANGIO: CPT | Mod: 26,59 | Performed by: INTERNAL MEDICINE

## 2022-01-01 PROCEDURE — 83605 ASSAY OF LACTIC ACID: CPT

## 2022-01-01 PROCEDURE — 84481 FREE ASSAY (FT-3): CPT

## 2022-01-01 PROCEDURE — 86923 COMPATIBILITY TEST ELECTRIC: CPT | Mod: 91

## 2022-01-01 PROCEDURE — 93005 ELECTROCARDIOGRAM TRACING: CPT | Performed by: EMERGENCY MEDICINE

## 2022-01-01 PROCEDURE — 02WAXRZ REVISION OF SHORT-TERM EXTERNAL HEART ASSIST SYSTEM IN HEART, EXTERNAL APPROACH: ICD-10-PCS | Performed by: INTERNAL MEDICINE

## 2022-01-01 PROCEDURE — 93306 TTE W/DOPPLER COMPLETE: CPT

## 2022-01-01 PROCEDURE — 700105 HCHG RX REV CODE 258

## 2022-01-01 PROCEDURE — 84484 ASSAY OF TROPONIN QUANT: CPT

## 2022-01-01 PROCEDURE — 700111 HCHG RX REV CODE 636 W/ 250 OVERRIDE (IP): Performed by: INTERNAL MEDICINE

## 2022-01-01 PROCEDURE — 95819 EEG AWAKE AND ASLEEP: CPT | Mod: 26 | Performed by: STUDENT IN AN ORGANIZED HEALTH CARE EDUCATION/TRAINING PROGRAM

## 2022-01-01 PROCEDURE — 302214 INTUBATION BOX: Performed by: EMERGENCY MEDICINE

## 2022-01-01 PROCEDURE — 84439 ASSAY OF FREE THYROXINE: CPT

## 2022-01-01 PROCEDURE — 33993 REPOSG PERQ R/L HRT VAD: CPT

## 2022-01-01 PROCEDURE — 85347 COAGULATION TIME ACTIVATED: CPT

## 2022-01-01 PROCEDURE — 36620 INSERTION CATHETER ARTERY: CPT

## 2022-01-01 PROCEDURE — 86901 BLOOD TYPING SEROLOGIC RH(D): CPT

## 2022-01-01 PROCEDURE — 85027 COMPLETE CBC AUTOMATED: CPT

## 2022-01-01 PROCEDURE — 93308 TTE F-UP OR LMTD: CPT | Mod: 26 | Performed by: INTERNAL MEDICINE

## 2022-01-01 PROCEDURE — 96374 THER/PROPH/DIAG INJ IV PUSH: CPT

## 2022-01-01 PROCEDURE — 71045 X-RAY EXAM CHEST 1 VIEW: CPT

## 2022-01-01 PROCEDURE — 95819 EEG AWAKE AND ASLEEP: CPT | Performed by: STUDENT IN AN ORGANIZED HEALTH CARE EDUCATION/TRAINING PROGRAM

## 2022-01-01 PROCEDURE — 03HY32Z INSERTION OF MONITORING DEVICE INTO UPPER ARTERY, PERCUTANEOUS APPROACH: ICD-10-PCS | Performed by: INTERNAL MEDICINE

## 2022-01-01 PROCEDURE — 700117 HCHG RX CONTRAST REV CODE 255: Performed by: INTERNAL MEDICINE

## 2022-01-01 PROCEDURE — B2151ZZ FLUOROSCOPY OF LEFT HEART USING LOW OSMOLAR CONTRAST: ICD-10-PCS | Performed by: INTERNAL MEDICINE

## 2022-01-01 PROCEDURE — 36620 INSERTION CATHETER ARTERY: CPT | Mod: GC | Performed by: INTERNAL MEDICINE

## 2022-01-01 PROCEDURE — 83615 LACTATE (LD) (LDH) ENZYME: CPT

## 2022-01-01 PROCEDURE — 93503 INSERT/PLACE HEART CATHETER: CPT | Performed by: INTERNAL MEDICINE

## 2022-01-01 PROCEDURE — 92950 HEART/LUNG RESUSCITATION CPR: CPT

## 2022-01-01 PROCEDURE — 83735 ASSAY OF MAGNESIUM: CPT

## 2022-01-01 PROCEDURE — 82803 BLOOD GASES ANY COMBINATION: CPT

## 2022-01-01 PROCEDURE — 99214 OFFICE O/P EST MOD 30 MIN: CPT | Performed by: FAMILY MEDICINE

## 2022-01-01 PROCEDURE — 93308 TTE F-UP OR LMTD: CPT

## 2022-01-01 PROCEDURE — 700105 HCHG RX REV CODE 258: Performed by: INTERNAL MEDICINE

## 2022-01-01 PROCEDURE — 700101 HCHG RX REV CODE 250

## 2022-01-01 PROCEDURE — 92978 ENDOLUMINL IVUS OCT C 1ST: CPT

## 2022-01-01 PROCEDURE — 99291 CRITICAL CARE FIRST HOUR: CPT | Mod: 25 | Performed by: INTERNAL MEDICINE

## 2022-01-01 PROCEDURE — 700101 HCHG RX REV CODE 250: Performed by: INTERNAL MEDICINE

## 2022-01-01 PROCEDURE — 700111 HCHG RX REV CODE 636 W/ 250 OVERRIDE (IP)

## 2022-01-01 PROCEDURE — 82803 BLOOD GASES ANY COMBINATION: CPT | Mod: 91

## 2022-01-01 PROCEDURE — 82330 ASSAY OF CALCIUM: CPT

## 2022-01-01 PROCEDURE — 80048 BASIC METABOLIC PNL TOTAL CA: CPT

## 2022-01-01 PROCEDURE — 99291 CRITICAL CARE FIRST HOUR: CPT | Performed by: INTERNAL MEDICINE

## 2022-01-01 PROCEDURE — 85610 PROTHROMBIN TIME: CPT

## 2022-01-01 PROCEDURE — 99292 CRITICAL CARE ADDL 30 MIN: CPT | Mod: 25 | Performed by: INTERNAL MEDICINE

## 2022-01-01 PROCEDURE — 84100 ASSAY OF PHOSPHORUS: CPT

## 2022-01-01 PROCEDURE — 94799 UNLISTED PULMONARY SVC/PX: CPT

## 2022-01-01 PROCEDURE — 02WA4RZ REVISION OF SHORT-TERM EXTERNAL HEART ASSIST SYSTEM IN HEART, PERCUTANEOUS ENDOSCOPIC APPROACH: ICD-10-PCS | Performed by: INTERNAL MEDICINE

## 2022-01-01 PROCEDURE — 02HP32Z INSERTION OF MONITORING DEVICE INTO PULMONARY TRUNK, PERCUTANEOUS APPROACH: ICD-10-PCS | Performed by: INTERNAL MEDICINE

## 2022-01-01 PROCEDURE — 700111 HCHG RX REV CODE 636 W/ 250 OVERRIDE (IP): Performed by: EMERGENCY MEDICINE

## 2022-01-01 PROCEDURE — 33990 INSJ PERQ VAD L HRT ARTERIAL: CPT | Performed by: INTERNAL MEDICINE

## 2022-01-01 PROCEDURE — 99152 MOD SED SAME PHYS/QHP 5/>YRS: CPT | Performed by: INTERNAL MEDICINE

## 2022-01-01 PROCEDURE — 82962 GLUCOSE BLOOD TEST: CPT

## 2022-01-01 PROCEDURE — 84132 ASSAY OF SERUM POTASSIUM: CPT

## 2022-01-01 PROCEDURE — 99153 MOD SED SAME PHYS/QHP EA: CPT

## 2022-01-01 PROCEDURE — C1751 CATH, INF, PER/CENT/MIDLINE: HCPCS

## 2022-01-01 PROCEDURE — 4A023N8 MEASUREMENT OF CARDIAC SAMPLING AND PRESSURE, BILATERAL, PERCUTANEOUS APPROACH: ICD-10-PCS | Performed by: INTERNAL MEDICINE

## 2022-01-01 PROCEDURE — 80061 LIPID PANEL: CPT

## 2022-01-01 PROCEDURE — 85025 COMPLETE CBC W/AUTO DIFF WBC: CPT

## 2022-01-01 PROCEDURE — 85384 FIBRINOGEN ACTIVITY: CPT

## 2022-01-01 PROCEDURE — 5A2204Z RESTORATION OF CARDIAC RHYTHM, SINGLE: ICD-10-PCS | Performed by: EMERGENCY MEDICINE

## 2022-01-01 PROCEDURE — 36556 INSERT NON-TUNNEL CV CATH: CPT

## 2022-01-01 PROCEDURE — 700102 HCHG RX REV CODE 250 W/ 637 OVERRIDE(OP)

## 2022-01-01 PROCEDURE — 80053 COMPREHEN METABOLIC PANEL: CPT

## 2022-01-01 PROCEDURE — 95822 EEG COMA OR SLEEP ONLY: CPT | Performed by: STUDENT IN AN ORGANIZED HEALTH CARE EDUCATION/TRAINING PROGRAM

## 2022-01-01 PROCEDURE — 37799 UNLISTED PX VASCULAR SURGERY: CPT

## 2022-01-01 PROCEDURE — 84295 ASSAY OF SERUM SODIUM: CPT

## 2022-01-01 PROCEDURE — 36556 INSERT NON-TUNNEL CV CATH: CPT | Mod: LT | Performed by: INTERNAL MEDICINE

## 2022-01-01 PROCEDURE — 31500 INSERT EMERGENCY AIRWAY: CPT

## 2022-01-01 PROCEDURE — 4A10X4Z MONITORING OF CENTRAL NERVOUS ELECTRICAL ACTIVITY, EXTERNAL APPROACH: ICD-10-PCS | Performed by: STUDENT IN AN ORGANIZED HEALTH CARE EDUCATION/TRAINING PROGRAM

## 2022-01-01 PROCEDURE — 83036 HEMOGLOBIN GLYCOSYLATED A1C: CPT

## 2022-01-01 PROCEDURE — 83690 ASSAY OF LIPASE: CPT

## 2022-01-01 PROCEDURE — 02HA3RZ INSERTION OF SHORT-TERM EXTERNAL HEART ASSIST SYSTEM INTO HEART, PERCUTANEOUS APPROACH: ICD-10-PCS | Performed by: INTERNAL MEDICINE

## 2022-01-01 PROCEDURE — 83880 ASSAY OF NATRIURETIC PEPTIDE: CPT

## 2022-01-01 PROCEDURE — B2111ZZ FLUOROSCOPY OF MULTIPLE CORONARY ARTERIES USING LOW OSMOLAR CONTRAST: ICD-10-PCS | Performed by: INTERNAL MEDICINE

## 2022-01-01 PROCEDURE — 70450 CT HEAD/BRAIN W/O DYE: CPT

## 2022-01-01 PROCEDURE — 86850 RBC ANTIBODY SCREEN: CPT

## 2022-01-01 PROCEDURE — 92978 ENDOLUMINL IVUS OCT C 1ST: CPT | Mod: 26,LD | Performed by: INTERNAL MEDICINE

## 2022-01-01 PROCEDURE — 770022 HCHG ROOM/CARE - ICU (200)

## 2022-01-01 PROCEDURE — 93503 INSERT/PLACE HEART CATHETER: CPT

## 2022-01-01 PROCEDURE — 94002 VENT MGMT INPAT INIT DAY: CPT

## 2022-01-01 PROCEDURE — 84443 ASSAY THYROID STIM HORMONE: CPT

## 2022-01-01 PROCEDURE — 93306 TTE W/DOPPLER COMPLETE: CPT | Mod: 26 | Performed by: INTERNAL MEDICINE

## 2022-01-01 PROCEDURE — A9270 NON-COVERED ITEM OR SERVICE: HCPCS

## 2022-01-01 PROCEDURE — 027034Z DILATION OF CORONARY ARTERY, ONE ARTERY WITH DRUG-ELUTING INTRALUMINAL DEVICE, PERCUTANEOUS APPROACH: ICD-10-PCS | Performed by: INTERNAL MEDICINE

## 2022-01-01 PROCEDURE — 5A0221D ASSISTANCE WITH CARDIAC OUTPUT USING IMPELLER PUMP, CONTINUOUS: ICD-10-PCS | Performed by: INTERNAL MEDICINE

## 2022-01-01 PROCEDURE — 99233 SBSQ HOSP IP/OBS HIGH 50: CPT | Performed by: INTERNAL MEDICINE

## 2022-01-01 PROCEDURE — 5A1935Z RESPIRATORY VENTILATION, LESS THAN 24 CONSECUTIVE HOURS: ICD-10-PCS | Performed by: EMERGENCY MEDICINE

## 2022-01-01 PROCEDURE — 92960 CARDIOVERSION ELECTRIC EXT: CPT

## 2022-01-01 PROCEDURE — 93503 INSERT/PLACE HEART CATHETER: CPT | Mod: 59 | Performed by: INTERNAL MEDICINE

## 2022-01-01 PROCEDURE — 36415 COLL VENOUS BLD VENIPUNCTURE: CPT

## 2022-01-01 PROCEDURE — 02H633Z INSERTION OF INFUSION DEVICE INTO RIGHT ATRIUM, PERCUTANEOUS APPROACH: ICD-10-PCS | Performed by: INTERNAL MEDICINE

## 2022-01-01 PROCEDURE — 99291 CRITICAL CARE FIRST HOUR: CPT

## 2022-01-01 PROCEDURE — 33993 REPOSG PERQ R/L HRT VAD: CPT | Mod: 53 | Performed by: INTERNAL MEDICINE

## 2022-01-01 PROCEDURE — 700102 HCHG RX REV CODE 250 W/ 637 OVERRIDE(OP): Performed by: INTERNAL MEDICINE

## 2022-01-01 PROCEDURE — 94003 VENT MGMT INPAT SUBQ DAY: CPT

## 2022-01-01 PROCEDURE — 5A12012 PERFORMANCE OF CARDIAC OUTPUT, SINGLE, MANUAL: ICD-10-PCS | Performed by: EMERGENCY MEDICINE

## 2022-01-01 PROCEDURE — 700101 HCHG RX REV CODE 250: Performed by: EMERGENCY MEDICINE

## 2022-01-01 PROCEDURE — 0BH18EZ INSERTION OF ENDOTRACHEAL AIRWAY INTO TRACHEA, VIA NATURAL OR ARTIFICIAL OPENING ENDOSCOPIC: ICD-10-PCS | Performed by: EMERGENCY MEDICINE

## 2022-01-01 PROCEDURE — 85576 BLOOD PLATELET AGGREGATION: CPT

## 2022-01-01 PROCEDURE — 85730 THROMBOPLASTIN TIME PARTIAL: CPT

## 2022-01-01 RX ORDER — ATORVASTATIN CALCIUM 80 MG/1
80 TABLET, FILM COATED ORAL EVERY EVENING
Status: DISCONTINUED | OUTPATIENT
Start: 2022-01-01 | End: 2022-01-01

## 2022-01-01 RX ORDER — PHENYLEPHRINE HCL IN 0.9% NACL 0.5 MG/5ML
SYRINGE (ML) INTRAVENOUS
Status: DISCONTINUED
Start: 2022-01-01 | End: 2022-01-01 | Stop reason: HOSPADM

## 2022-01-01 RX ORDER — LORAZEPAM 2 MG/ML
2 INJECTION INTRAMUSCULAR ONCE
Status: COMPLETED | OUTPATIENT
Start: 2022-01-01 | End: 2022-01-01

## 2022-01-01 RX ORDER — PRAVASTATIN SODIUM 20 MG
20 TABLET ORAL EVERY EVENING
Status: DISCONTINUED | OUTPATIENT
Start: 2022-01-01 | End: 2022-01-01

## 2022-01-01 RX ORDER — SODIUM BICARBONATE IN D5W 150/1000ML
PLASTIC BAG, INJECTION (ML) INTRAVENOUS CONTINUOUS
Status: DISCONTINUED | OUTPATIENT
Start: 2022-01-01 | End: 2022-01-01

## 2022-01-01 RX ORDER — HEPARIN SODIUM 1000 [USP'U]/ML
INJECTION, SOLUTION INTRAVENOUS; SUBCUTANEOUS
Status: COMPLETED
Start: 2022-01-01 | End: 2022-01-01

## 2022-01-01 RX ORDER — POTASSIUM CHLORIDE 29.8 MG/ML
40 INJECTION INTRAVENOUS ONCE
Status: COMPLETED | OUTPATIENT
Start: 2022-01-01 | End: 2022-01-01

## 2022-01-01 RX ORDER — EPINEPHRINE HCL IN 0.9 % NACL 4MG/250ML
0-.5 PLASTIC BAG, INJECTION (ML) INTRAVENOUS CONTINUOUS
Status: DISCONTINUED | OUTPATIENT
Start: 2022-01-01 | End: 2022-01-01

## 2022-01-01 RX ORDER — SODIUM CHLORIDE, SODIUM LACTATE, POTASSIUM CHLORIDE, CALCIUM CHLORIDE 600; 310; 30; 20 MG/100ML; MG/100ML; MG/100ML; MG/100ML
INJECTION, SOLUTION INTRAVENOUS CONTINUOUS
Status: DISCONTINUED | OUTPATIENT
Start: 2022-01-01 | End: 2022-01-01

## 2022-01-01 RX ORDER — LORAZEPAM 2 MG/ML
1 CONCENTRATE ORAL
Status: DISCONTINUED | OUTPATIENT
Start: 2022-01-01 | End: 2022-01-01 | Stop reason: HOSPADM

## 2022-01-01 RX ORDER — LORAZEPAM 2 MG/ML
INJECTION INTRAMUSCULAR
Status: COMPLETED
Start: 2022-01-01 | End: 2022-01-01

## 2022-01-01 RX ORDER — DOCUSATE SODIUM 100 MG/1
100 CAPSULE, LIQUID FILLED ORAL EVERY 12 HOURS
Status: DISCONTINUED | OUTPATIENT
Start: 2022-01-01 | End: 2022-01-01 | Stop reason: HOSPADM

## 2022-01-01 RX ORDER — CALCIUM CHLORIDE 100 MG/ML
1 INJECTION INTRAVENOUS; INTRAVENTRICULAR ONCE
Status: COMPLETED | OUTPATIENT
Start: 2022-01-01 | End: 2022-01-01

## 2022-01-01 RX ORDER — ENOXAPARIN SODIUM 100 MG/ML
40 INJECTION SUBCUTANEOUS DAILY
Status: DISCONTINUED | OUTPATIENT
Start: 2022-01-01 | End: 2022-01-01

## 2022-01-01 RX ORDER — CEFAZOLIN SODIUM 1 G/3ML
2 INJECTION, POWDER, FOR SOLUTION INTRAMUSCULAR; INTRAVENOUS ONCE
Status: DISCONTINUED | OUTPATIENT
Start: 2022-01-01 | End: 2022-01-01

## 2022-01-01 RX ORDER — ONDANSETRON 4 MG/1
8 TABLET, ORALLY DISINTEGRATING ORAL EVERY 8 HOURS PRN
Status: DISCONTINUED | OUTPATIENT
Start: 2022-01-01 | End: 2022-01-01 | Stop reason: HOSPADM

## 2022-01-01 RX ORDER — ONDANSETRON 2 MG/ML
8 INJECTION INTRAMUSCULAR; INTRAVENOUS EVERY 8 HOURS PRN
Status: DISCONTINUED | OUTPATIENT
Start: 2022-01-01 | End: 2022-01-01 | Stop reason: HOSPADM

## 2022-01-01 RX ORDER — MIDAZOLAM HYDROCHLORIDE 1 MG/ML
2 INJECTION INTRAMUSCULAR; INTRAVENOUS ONCE
Status: DISCONTINUED | OUTPATIENT
Start: 2022-01-01 | End: 2022-01-01

## 2022-01-01 RX ORDER — PRAVASTATIN SODIUM 20 MG
20 TABLET ORAL EVERY EVENING
Qty: 90 TABLET | Refills: 3 | Status: SHIPPED | OUTPATIENT
Start: 2022-01-01

## 2022-01-01 RX ORDER — LORAZEPAM 2 MG/ML
1 INJECTION INTRAMUSCULAR
Status: DISCONTINUED | OUTPATIENT
Start: 2022-01-01 | End: 2022-01-01 | Stop reason: HOSPADM

## 2022-01-01 RX ORDER — DEXTROSE MONOHYDRATE 50 MG/ML
INJECTION, SOLUTION INTRAVENOUS CONTINUOUS
Status: DISCONTINUED | OUTPATIENT
Start: 2022-01-01 | End: 2022-01-01

## 2022-01-01 RX ORDER — 0.9 % SODIUM CHLORIDE 0.9 %
10 INTRAVENOUS SOLUTION INTRAVENOUS
Status: DISCONTINUED | OUTPATIENT
Start: 2022-01-01 | End: 2022-01-01

## 2022-01-01 RX ORDER — PHENYLEPHRINE HYDROCHLORIDE 10 MG/ML
INJECTION, SOLUTION INTRAMUSCULAR; INTRAVENOUS; SUBCUTANEOUS
Status: COMPLETED
Start: 2022-01-01 | End: 2022-01-01

## 2022-01-01 RX ORDER — PRASUGREL 10 MG/1
TABLET, FILM COATED ORAL
Status: COMPLETED
Start: 2022-01-01 | End: 2022-01-01

## 2022-01-01 RX ORDER — HEPARIN SODIUM 1000 [USP'U]/ML
INJECTION, SOLUTION INTRAVENOUS; SUBCUTANEOUS
Status: DISPENSED
Start: 2022-01-01 | End: 2022-01-01

## 2022-01-01 RX ORDER — HEPARIN SODIUM 5000 [USP'U]/100ML
0-1000 INJECTION, SOLUTION INTRAVENOUS CONTINUOUS
Status: DISCONTINUED | OUTPATIENT
Start: 2022-01-01 | End: 2022-01-01

## 2022-01-01 RX ORDER — BIVALIRUDIN 250 MG/5ML
INJECTION, POWDER, LYOPHILIZED, FOR SOLUTION INTRAVENOUS
Status: DISPENSED
Start: 2022-01-01 | End: 2022-01-01

## 2022-01-01 RX ORDER — SODIUM CHLORIDE, SODIUM LACTATE, POTASSIUM CHLORIDE, AND CALCIUM CHLORIDE .6; .31; .03; .02 G/100ML; G/100ML; G/100ML; G/100ML
1000 INJECTION, SOLUTION INTRAVENOUS ONCE
Status: COMPLETED | OUTPATIENT
Start: 2022-01-01 | End: 2022-01-01

## 2022-01-01 RX ORDER — SODIUM CHLORIDE 9 MG/ML
INJECTION, SOLUTION INTRAVENOUS CONTINUOUS
Status: DISCONTINUED | OUTPATIENT
Start: 2022-01-01 | End: 2022-01-01

## 2022-01-01 RX ORDER — PRASUGREL 10 MG/1
10 TABLET, FILM COATED ORAL DAILY
Status: DISCONTINUED | OUTPATIENT
Start: 2022-01-01 | End: 2022-01-01

## 2022-01-01 RX ORDER — LIDOCAINE HYDROCHLORIDE 20 MG/ML
INJECTION, SOLUTION INFILTRATION; PERINEURAL
Status: COMPLETED
Start: 2022-01-01 | End: 2022-01-01

## 2022-01-01 RX ORDER — EPINEPHRINE 0.1 MG/ML
SYRINGE (ML) INJECTION
Status: COMPLETED | OUTPATIENT
Start: 2022-01-01 | End: 2022-01-01

## 2022-01-01 RX ORDER — AMIODARONE HYDROCHLORIDE 150 MG/3ML
INJECTION, SOLUTION INTRAVENOUS
Status: COMPLETED | OUTPATIENT
Start: 2022-01-01 | End: 2022-01-01

## 2022-01-01 RX ORDER — PRASUGREL 10 MG/1
60 TABLET, FILM COATED ORAL ONCE
Status: DISCONTINUED | OUTPATIENT
Start: 2022-01-01 | End: 2022-01-01

## 2022-01-01 RX ORDER — ASPIRIN 81 MG/1
81 TABLET, CHEWABLE ORAL DAILY
Status: DISCONTINUED | OUTPATIENT
Start: 2022-01-01 | End: 2022-01-01

## 2022-01-01 RX ORDER — HEPARIN SODIUM 200 [USP'U]/100ML
INJECTION, SOLUTION INTRAVENOUS
Status: COMPLETED
Start: 2022-01-01 | End: 2022-01-01

## 2022-01-01 RX ORDER — NOREPINEPHRINE BITARTRATE 1 MG/ML
INJECTION, SOLUTION INTRAVENOUS
Status: COMPLETED
Start: 2022-01-01 | End: 2022-01-01

## 2022-01-01 RX ORDER — MIDAZOLAM HYDROCHLORIDE 1 MG/ML
2 INJECTION INTRAMUSCULAR; INTRAVENOUS
Status: DISCONTINUED | OUTPATIENT
Start: 2022-01-01 | End: 2022-01-01

## 2022-01-01 RX ORDER — MAGNESIUM SULFATE HEPTAHYDRATE 40 MG/ML
.5-2 INJECTION, SOLUTION INTRAVENOUS CONTINUOUS
Status: DISCONTINUED | OUTPATIENT
Start: 2022-01-01 | End: 2022-01-01

## 2022-01-01 RX ORDER — LIDOCAINE HYDROCHLORIDE 10 MG/ML
INJECTION, SOLUTION INFILTRATION; PERINEURAL
Status: DISCONTINUED
Start: 2022-01-01 | End: 2022-01-01 | Stop reason: HOSPADM

## 2022-01-01 RX ORDER — VERAPAMIL HYDROCHLORIDE 2.5 MG/ML
INJECTION, SOLUTION INTRAVENOUS
Status: COMPLETED
Start: 2022-01-01 | End: 2022-01-01

## 2022-01-01 RX ORDER — AMIODARONE HYDROCHLORIDE 150 MG/3ML
INJECTION, SOLUTION INTRAVENOUS
Status: COMPLETED
Start: 2022-01-01 | End: 2022-01-01

## 2022-01-01 RX ORDER — 0.9 % SODIUM CHLORIDE 0.9 %
20 INTRAVENOUS SOLUTION INTRAVENOUS ONCE
Status: DISCONTINUED | OUTPATIENT
Start: 2022-01-01 | End: 2022-01-01

## 2022-01-01 RX ORDER — CALCIUM CHLORIDE 100 MG/ML
INJECTION INTRAVENOUS; INTRAVENTRICULAR
Status: COMPLETED
Start: 2022-01-01 | End: 2022-01-01

## 2022-01-01 RX ORDER — HEPARIN SODIUM 1000 [USP'U]/ML
4000 INJECTION, SOLUTION INTRAVENOUS; SUBCUTANEOUS ONCE
Status: COMPLETED | OUTPATIENT
Start: 2022-01-01 | End: 2022-01-01

## 2022-01-01 RX ORDER — ADENOSINE 3 MG/ML
INJECTION, SOLUTION INTRAVENOUS
Status: COMPLETED
Start: 2022-01-01 | End: 2022-01-01

## 2022-01-01 RX ORDER — LABETALOL HYDROCHLORIDE 5 MG/ML
10 INJECTION, SOLUTION INTRAVENOUS EVERY 4 HOURS PRN
Status: DISCONTINUED | OUTPATIENT
Start: 2022-01-01 | End: 2022-01-01

## 2022-01-01 RX ORDER — POLYETHYLENE GLYCOL 3350 17 G/17G
1 POWDER, FOR SOLUTION ORAL
Status: DISCONTINUED | OUTPATIENT
Start: 2022-01-01 | End: 2022-01-01 | Stop reason: HOSPADM

## 2022-01-01 RX ORDER — MAGNESIUM SULFATE HEPTAHYDRATE 40 MG/ML
4 INJECTION, SOLUTION INTRAVENOUS ONCE
Status: COMPLETED | OUTPATIENT
Start: 2022-01-01 | End: 2022-01-01

## 2022-01-01 RX ORDER — CALCIUM CHLORIDE 100 MG/ML
INJECTION INTRAVENOUS; INTRAVENTRICULAR
Status: DISCONTINUED
Start: 2022-01-01 | End: 2022-01-01 | Stop reason: HOSPADM

## 2022-01-01 RX ORDER — POTASSIUM CHLORIDE 14.9 MG/ML
20 INJECTION INTRAVENOUS ONCE
Status: COMPLETED | OUTPATIENT
Start: 2022-01-01 | End: 2022-01-01

## 2022-01-01 RX ORDER — ACETAMINOPHEN 325 MG/1
650 TABLET ORAL EVERY 6 HOURS PRN
Status: DISCONTINUED | OUTPATIENT
Start: 2022-01-01 | End: 2022-01-01 | Stop reason: HOSPADM

## 2022-01-01 RX ORDER — ATROPINE SULFATE 10 MG/ML
2 SOLUTION/ DROPS OPHTHALMIC EVERY 4 HOURS PRN
Status: DISCONTINUED | OUTPATIENT
Start: 2022-01-01 | End: 2022-01-01 | Stop reason: HOSPADM

## 2022-01-01 RX ORDER — PHENYLEPHRINE HCL IN 0.9% NACL 0.5 MG/5ML
SYRINGE (ML) INTRAVENOUS
Status: COMPLETED
Start: 2022-01-01 | End: 2022-01-01

## 2022-01-01 RX ORDER — NOREPINEPHRINE BITARTRATE 0.03 MG/ML
0-1 INJECTION, SOLUTION INTRAVENOUS CONTINUOUS
Status: DISCONTINUED | OUTPATIENT
Start: 2022-01-01 | End: 2022-01-01

## 2022-01-01 RX ORDER — LEVOTHYROXINE SODIUM 88 UG/1
88 TABLET ORAL
Status: DISCONTINUED | OUTPATIENT
Start: 2022-01-01 | End: 2022-01-01

## 2022-01-01 RX ORDER — BISACODYL 10 MG
10 SUPPOSITORY, RECTAL RECTAL
Status: DISCONTINUED | OUTPATIENT
Start: 2022-01-01 | End: 2022-01-01 | Stop reason: HOSPADM

## 2022-01-01 RX ORDER — SODIUM CHLORIDE 9 MG/ML
INJECTION, SOLUTION INTRAVENOUS CONTINUOUS
Status: ACTIVE | OUTPATIENT
Start: 2022-01-01 | End: 2022-01-01

## 2022-01-01 RX ORDER — ROCURONIUM BROMIDE 10 MG/ML
INJECTION, SOLUTION INTRAVENOUS
Status: COMPLETED | OUTPATIENT
Start: 2022-01-01 | End: 2022-01-01

## 2022-01-01 RX ORDER — VECURONIUM BROMIDE 1 MG/ML
0.1 INJECTION, POWDER, LYOPHILIZED, FOR SOLUTION INTRAVENOUS ONCE
Status: DISCONTINUED | OUTPATIENT
Start: 2022-01-01 | End: 2022-01-01

## 2022-01-01 RX ORDER — AMOXICILLIN 250 MG
2 CAPSULE ORAL 2 TIMES DAILY
Status: DISCONTINUED | OUTPATIENT
Start: 2022-01-01 | End: 2022-01-01 | Stop reason: HOSPADM

## 2022-01-01 RX ORDER — POTASSIUM CHLORIDE 7.45 MG/ML
10 INJECTION INTRAVENOUS ONCE
Status: DISCONTINUED | OUTPATIENT
Start: 2022-01-01 | End: 2022-01-01

## 2022-01-01 RX ORDER — MEPERIDINE HYDROCHLORIDE 50 MG/ML
25 INJECTION INTRAMUSCULAR; INTRAVENOUS; SUBCUTANEOUS
Status: DISCONTINUED | OUTPATIENT
Start: 2022-01-01 | End: 2022-01-01

## 2022-01-01 RX ADMIN — Medication 50 MCG/HR: at 23:41

## 2022-01-01 RX ADMIN — SODIUM BICARBONATE 50 MEQ: 84 INJECTION, SOLUTION INTRAVENOUS at 07:14

## 2022-01-01 RX ADMIN — POTASSIUM CHLORIDE 40 MEQ: 29.8 INJECTION, SOLUTION INTRAVENOUS at 20:07

## 2022-01-01 RX ADMIN — PHENYLEPHRINE HYDROCHLORIDE 10000 MCG: 10 INJECTION INTRAVENOUS at 06:31

## 2022-01-01 RX ADMIN — HEPARIN SODIUM 4000 UNITS: 1000 INJECTION, SOLUTION INTRAVENOUS; SUBCUTANEOUS at 16:26

## 2022-01-01 RX ADMIN — PROPOFOL 50 MG/KG/HR: 10 INJECTION, EMULSION INTRAVENOUS at 07:00

## 2022-01-01 RX ADMIN — CALCIUM CHLORIDE 1 G: 100 INJECTION INTRAVENOUS; INTRAVENTRICULAR at 07:37

## 2022-01-01 RX ADMIN — PHENYLEPHRINE HYDROCHLORIDE 10000 MCG: 10 INJECTION INTRAVENOUS at 06:30

## 2022-01-01 RX ADMIN — HEPARIN SODIUM: 1000 INJECTION, SOLUTION INTRAVENOUS; SUBCUTANEOUS at 06:32

## 2022-01-01 RX ADMIN — LIDOCAINE HYDROCHLORIDE: 20 INJECTION, SOLUTION INFILTRATION; PERINEURAL at 16:44

## 2022-01-01 RX ADMIN — EPINEPHRINE 1 MG: 0.1 INJECTION, SOLUTION INTRAVENOUS at 16:20

## 2022-01-01 RX ADMIN — MORPHINE SULFATE 5 MG: 10 INJECTION INTRAVENOUS at 10:15

## 2022-01-01 RX ADMIN — DOBUTAMINE IN DEXTROSE 2.5 MCG/KG/MIN: 100 INJECTION, SOLUTION INTRAVENOUS at 07:33

## 2022-01-01 RX ADMIN — MEPERIDINE HYDROCHLORIDE 25 MG: 50 INJECTION INTRAMUSCULAR; INTRAVENOUS; SUBCUTANEOUS at 23:53

## 2022-01-01 RX ADMIN — Medication 1 MG: at 17:28

## 2022-01-01 RX ADMIN — Medication 50 MEQ: at 02:25

## 2022-01-01 RX ADMIN — NOREPINEPHRINE BITARTRATE 8 MG: 1 INJECTION, SOLUTION, CONCENTRATE INTRAVENOUS at 06:29

## 2022-01-01 RX ADMIN — HEPARIN SODIUM: 1000 INJECTION, SOLUTION INTRAVENOUS; SUBCUTANEOUS at 16:44

## 2022-01-01 RX ADMIN — LORAZEPAM 2 MG: 2 INJECTION INTRAMUSCULAR at 20:45

## 2022-01-01 RX ADMIN — MAGNESIUM SULFATE HEPTAHYDRATE 4 G: 40 INJECTION, SOLUTION INTRAVENOUS at 20:06

## 2022-01-01 RX ADMIN — SODIUM BICARBONATE 50 MEQ: 84 INJECTION, SOLUTION INTRAVENOUS at 02:20

## 2022-01-01 RX ADMIN — EPINEPHRINE 1 MCG/KG/MIN: 1 INJECTION INTRAMUSCULAR; INTRAVENOUS; SUBCUTANEOUS at 09:38

## 2022-01-01 RX ADMIN — FENTANYL CITRATE 100 MCG: 50 INJECTION, SOLUTION INTRAMUSCULAR; INTRAVENOUS at 22:44

## 2022-01-01 RX ADMIN — FENTANYL CITRATE 100 MCG: 50 INJECTION, SOLUTION INTRAMUSCULAR; INTRAVENOUS at 17:15

## 2022-01-01 RX ADMIN — Medication 200 MCG/HR: at 07:23

## 2022-01-01 RX ADMIN — LORAZEPAM 2 MG: 2 INJECTION INTRAMUSCULAR; INTRAVENOUS at 20:45

## 2022-01-01 RX ADMIN — HEPARIN SODIUM: 1000 INJECTION, SOLUTION INTRAVENOUS; SUBCUTANEOUS at 17:07

## 2022-01-01 RX ADMIN — AMIODARONE HYDROCHLORIDE 0.5 MG/MIN: 1.8 INJECTION, SOLUTION INTRAVENOUS at 07:47

## 2022-01-01 RX ADMIN — VERAPAMIL HYDROCHLORIDE 5 MG: 2.5 INJECTION, SOLUTION INTRAVENOUS at 16:44

## 2022-01-01 RX ADMIN — PROPOFOL 100 MG: 10 INJECTION, EMULSION INTRAVENOUS at 16:24

## 2022-01-01 RX ADMIN — HEPARIN SODIUM 2000 UNITS: 200 INJECTION, SOLUTION INTRAVENOUS at 16:44

## 2022-01-01 RX ADMIN — NITROGLYCERIN 10 ML: 20 INJECTION INTRAVENOUS at 16:44

## 2022-01-01 RX ADMIN — NOREPINEPHRINE BITARTRATE 0.5 MCG/KG/MIN: 1 INJECTION, SOLUTION, CONCENTRATE INTRAVENOUS at 01:34

## 2022-01-01 RX ADMIN — AMIODARONE HYDROCHLORIDE 150 MG: 50 INJECTION, SOLUTION INTRAVENOUS at 17:25

## 2022-01-01 RX ADMIN — FENTANYL CITRATE 100 MCG: 50 INJECTION, SOLUTION INTRAMUSCULAR; INTRAVENOUS at 21:43

## 2022-01-01 RX ADMIN — EPINEPHRINE 0.5 MCG/KG/MIN: 1 INJECTION INTRAMUSCULAR; INTRAVENOUS; SUBCUTANEOUS at 05:12

## 2022-01-01 RX ADMIN — AMIODARONE HYDROCHLORIDE 150 MG: 50 INJECTION, SOLUTION INTRAVENOUS at 16:28

## 2022-01-01 RX ADMIN — POTASSIUM CHLORIDE 20 MEQ: 14.9 INJECTION, SOLUTION INTRAVENOUS at 02:10

## 2022-01-01 RX ADMIN — SODIUM BICARBONATE 150 MEQ: 84 INJECTION, SOLUTION INTRAVENOUS at 03:03

## 2022-01-01 RX ADMIN — PRASUGREL 60 MG: 10 TABLET, FILM COATED ORAL at 18:15

## 2022-01-01 RX ADMIN — ADENOSINE 6 MG: 3 INJECTION INTRAVENOUS at 17:14

## 2022-01-01 RX ADMIN — IOHEXOL 100 ML: 350 INJECTION, SOLUTION INTRAVENOUS at 17:26

## 2022-01-01 RX ADMIN — DEXTROSE MONOHYDRATE: 50 INJECTION, SOLUTION INTRAVENOUS at 20:00

## 2022-01-01 RX ADMIN — CALCIUM CHLORIDE 1000 MG: 100 INJECTION, SOLUTION INTRAVENOUS at 21:31

## 2022-01-01 RX ADMIN — MAGNESIUM SULFATE IN WATER 0.5 G/HR: 40 INJECTION, SOLUTION INTRAVENOUS at 03:43

## 2022-01-01 RX ADMIN — AMIODARONE HYDROCHLORIDE 1 MG/MIN: 1.8 INJECTION, SOLUTION INTRAVENOUS at 01:45

## 2022-01-01 RX ADMIN — NOREPINEPHRINE BITARTRATE 0.8 MCG/KG/MIN: 1 INJECTION, SOLUTION, CONCENTRATE INTRAVENOUS at 04:02

## 2022-01-01 RX ADMIN — NOREPINEPHRINE BITARTRATE 1 MCG/KG/MIN: 1 INJECTION INTRAVENOUS at 08:09

## 2022-01-01 RX ADMIN — EPINEPHRINE 0.05 MCG/KG/MIN: 1 INJECTION INTRAMUSCULAR; INTRAVENOUS; SUBCUTANEOUS at 20:45

## 2022-01-01 RX ADMIN — SODIUM CHLORIDE 4 UNITS/HR: 9 INJECTION, SOLUTION INTRAVENOUS at 00:48

## 2022-01-01 RX ADMIN — EPINEPHRINE 0.5 MCG/KG/MIN: 1 INJECTION INTRAMUSCULAR; INTRAVENOUS; SUBCUTANEOUS at 03:28

## 2022-01-01 RX ADMIN — HEPARIN SODIUM 2000 UNITS: 200 INJECTION, SOLUTION INTRAVENOUS at 06:32

## 2022-01-01 RX ADMIN — SODIUM CHLORIDE, POTASSIUM CHLORIDE, SODIUM LACTATE AND CALCIUM CHLORIDE 1000 ML: 600; 310; 30; 20 INJECTION, SOLUTION INTRAVENOUS at 00:59

## 2022-01-01 RX ADMIN — POTASSIUM CHLORIDE 20 MEQ: 14.9 INJECTION, SOLUTION INTRAVENOUS at 07:12

## 2022-01-01 RX ADMIN — PROPOFOL 50 MCG/KG/MIN: 10 INJECTION, EMULSION INTRAVENOUS at 02:43

## 2022-01-01 RX ADMIN — EPINEPHRINE 0.5 MCG/KG/MIN: 1 INJECTION INTRAMUSCULAR; INTRAVENOUS; SUBCUTANEOUS at 05:14

## 2022-01-01 RX ADMIN — SODIUM CHLORIDE 11 UNITS/HR: 9 INJECTION, SOLUTION INTRAVENOUS at 04:40

## 2022-01-01 RX ADMIN — AMIODARONE HYDROCHLORIDE 1 MG/MIN: 1.8 INJECTION, SOLUTION INTRAVENOUS at 19:57

## 2022-01-01 RX ADMIN — NOREPINEPHRINE BITARTRATE 1 MCG/KG/MIN: 1 INJECTION INTRAVENOUS at 04:45

## 2022-01-01 RX ADMIN — AMIODARONE HYDROCHLORIDE 150 MG: 1.5 INJECTION, SOLUTION INTRAVENOUS at 02:13

## 2022-01-01 RX ADMIN — NOREPINEPHRINE BITARTRATE 0.2 MCG/KG/MIN: 1 INJECTION, SOLUTION, CONCENTRATE INTRAVENOUS at 22:16

## 2022-01-01 RX ADMIN — PROPOFOL 100 MG: 10 INJECTION, EMULSION INTRAVENOUS at 16:18

## 2022-01-01 RX ADMIN — HEPARIN SODIUM 417 UNITS/HR: 5000 INJECTION, SOLUTION INTRAVENOUS at 21:50

## 2022-01-01 RX ADMIN — SODIUM BICARBONATE 50 MEQ: 84 INJECTION, SOLUTION INTRAVENOUS at 02:25

## 2022-01-01 RX ADMIN — CALCIUM CHLORIDE 1 G: 100 INJECTION INTRAVENOUS; INTRAVENTRICULAR at 07:12

## 2022-01-01 RX ADMIN — EPINEPHRINE 1 MG: 0.1 INJECTION, SOLUTION INTRAVENOUS at 16:33

## 2022-01-01 RX ADMIN — DEXTROSE MONOHYDRATE 25 G: 100 INJECTION, SOLUTION INTRAVENOUS at 23:35

## 2022-01-01 RX ADMIN — ROCURONIUM BROMIDE 100 MG: 10 INJECTION, SOLUTION INTRAVENOUS at 16:24

## 2022-01-01 RX ADMIN — INSULIN HUMAN 0 UNITS: 100 INJECTION, SOLUTION PARENTERAL at 20:25

## 2022-01-01 RX ADMIN — HEPARIN SODIUM 433 UNITS/HR: 1000 INJECTION, SOLUTION INTRAVENOUS; SUBCUTANEOUS at 21:00

## 2022-01-01 RX ADMIN — MEPERIDINE HYDROCHLORIDE 25 MG: 50 INJECTION INTRAMUSCULAR; INTRAVENOUS; SUBCUTANEOUS at 22:50

## 2022-01-01 RX ADMIN — EPINEPHRINE 0.03 MCG/KG/MIN: 1 INJECTION INTRAMUSCULAR; INTRAVENOUS; SUBCUTANEOUS at 20:53

## 2022-01-01 RX ADMIN — PROPOFOL 20 MCG/KG/MIN: 10 INJECTION, EMULSION INTRAVENOUS at 20:48

## 2022-01-01 RX ADMIN — SODIUM CHLORIDE 11 UNITS/HR: 9 INJECTION, SOLUTION INTRAVENOUS at 07:53

## 2022-01-01 ASSESSMENT — PATIENT HEALTH QUESTIONNAIRE - PHQ9: CLINICAL INTERPRETATION OF PHQ2 SCORE: 0

## 2022-01-01 ASSESSMENT — PAIN DESCRIPTION - PAIN TYPE
TYPE: ACUTE PAIN;SURGICAL PAIN

## 2022-07-20 PROBLEM — M25.552 LEFT HIP PAIN: Status: ACTIVE | Noted: 2022-01-01

## 2022-07-20 PROBLEM — M16.12 OSTEOARTHRITIS OF LEFT HIP: Status: ACTIVE | Noted: 2022-01-01

## 2022-11-08 NOTE — PROGRESS NOTES
"Subjective:     CC:  Diagnoses of Fatigue, unspecified type, Hypothyroidism, unspecified type, Hyperlipidemia, unspecified hyperlipidemia type, Chronic pain of both knees, Hypokalemia, and Prostate cancer screening were pertinent to this visit.    HISTORY OF THE PRESENT ILLNESS: Patient is a 67 y.o. male. This pleasant patient is here today to establish care and discuss getting labs and medication management. His prior PCP was Manohar.    Has stopped medication, been without PCP for a while  Notes diffuse join pain, worse in the cold. He is followed by ortho at [facility]    Patient has been worked up for chest pain in the distant past, endorses getting similar but less frequent and less severe quick bursts of chest pain. He was on pravastatin previously, stopped taking it due to not having PCP and not establishing with a new one.    Notes fatigue since retiring. Gets winded easier than he thinks he should. Was a  at one to [employer]. He was on his feet a lot for that. Patient notes during the summer when he was active it wasn't as noticeable.    Problem   Chronic Pain of Both Knees   Hypothyroidism   Hyperlipidemia   Fatigue       Current Outpatient Medications Ordered in Epic   Medication Sig Dispense Refill    pravastatin (PRAVACHOL) 20 MG Tab Take 1 Tablet by mouth every evening. 90 Tablet 3    Misc Natural Products (COMPLETE PROSTATE HEALTH) TABS Take  by mouth.      Multiple Vitamin (MULTIVITAMIN) capsule Take  by mouth.       No current Epic-ordered facility-administered medications on file.       Health Maintenance: Patient declines vaccine at this time    ROS:   ROS see HP      Objective:       Exam: /86   Pulse 97   Temp 37 °C (98.6 °F) (Temporal)   Ht 1.854 m (6' 1\")   Wt 80.3 kg (177 lb)   SpO2 96%  Body mass index is 23.35 kg/m².    Physical Exam  Vitals reviewed.   Constitutional:       General: He is not in acute distress.     Appearance: Normal appearance.   HENT:      Head: Normocephalic and " atraumatic.   Cardiovascular:      Rate and Rhythm: Normal rate and regular rhythm.      Heart sounds: Normal heart sounds.   Pulmonary:      Effort: Pulmonary effort is normal.      Breath sounds: Normal breath sounds.   Skin:     General: Skin is warm and dry.   Neurological:      Mental Status: He is alert. Mental status is at baseline.   Psychiatric:         Mood and Affect: Mood normal.         Behavior: Behavior normal.         Assessment & Plan:   67 y.o. male with the following -    Problem List Items Addressed This Visit       Fatigue     Patient complaining of fatigue will check for organic causes. Recommend that he try increasing his daily activity to improve stamina.         Relevant Orders    Comp Metabolic Panel    CBC WITHOUT DIFFERENTIAL    Hypokalemia    Relevant Orders    Comp Metabolic Panel    Hypothyroidism     Patient has not been taking thyroid medication  Will recheck labs and reassess at that time         Relevant Orders    TSH WITH REFLEX TO FT4    Hyperlipidemia     Discussed given history and past lab finding best practice to stay on statin at this time. Patient to get updated labs, sent in pravastatin to reinitiate.          Relevant Medications    pravastatin (PRAVACHOL) 20 MG Tab    Other Relevant Orders    Comp Metabolic Panel    Lipid Profile    Chronic pain of both knees     Patient to continue to follow up with Ortho for this complaint.          Other Visit Diagnoses       Prostate cancer screening        Relevant Orders    PROSTATE SPECIFIC AG SCREENING            Return in about 4 weeks (around 12/6/2022) for Lab F/U, Medication F/U.    Please note that this dictation was created using voice recognition software. I have made every reasonable attempt to correct obvious errors, but I expect that there are errors of grammar and possibly content that I did not discover before finalizing the note.

## 2022-11-08 NOTE — ASSESSMENT & PLAN NOTE
Patient complaining of fatigue will check for organic causes. Recommend that he try increasing his daily activity to improve stamina.

## 2022-11-08 NOTE — ASSESSMENT & PLAN NOTE
Discussed given history and past lab finding best practice to stay on statin at this time. Patient to get updated labs, sent in pravastatin to reinitiate.

## 2022-12-29 PROBLEM — R57.0 CARDIOGENIC SHOCK (HCC): Status: ACTIVE | Noted: 2022-01-01

## 2022-12-29 PROBLEM — R94.30 NONSPECIFIC ABNORMAL FUNCTION STUDY, CARDIOVASCULAR: Status: ACTIVE | Noted: 2022-01-01

## 2022-12-29 PROBLEM — I47.20 VT (VENTRICULAR TACHYCARDIA) (HCC): Status: ACTIVE | Noted: 2022-01-01

## 2022-12-29 PROBLEM — I46.9 CARDIAC ARREST (HCC): Status: ACTIVE | Noted: 2022-01-01

## 2022-12-29 PROBLEM — I25.10 CAD (CORONARY ARTERY DISEASE): Status: ACTIVE | Noted: 2022-01-01

## 2022-12-30 PROBLEM — J96.01 ACUTE RESPIRATORY FAILURE WITH HYPOXIA (HCC): Status: ACTIVE | Noted: 2022-01-01

## 2022-12-30 PROBLEM — D62 ACUTE BLOOD LOSS ANEMIA: Status: ACTIVE | Noted: 2022-01-01

## 2022-12-30 PROBLEM — R79.89 ABNORMAL LFTS: Status: ACTIVE | Noted: 2022-01-01

## 2022-12-30 PROBLEM — N17.9 ACUTE RENAL FAILURE (ARF) (HCC): Status: ACTIVE | Noted: 2022-01-01

## 2022-12-30 NOTE — PROGRESS NOTES
Pt transported from Cath Lab to [facility] at 1837. Patient is vented with Left sided Impella placed at 89, a right IJ swan, 2 PIV. Running Levo at 1.5 and Epi at .03. Patient placed on monitoring equipment. Echo at bedside.

## 2022-12-30 NOTE — PROGRESS NOTES
Monitor Summary:    Sinus Rhythm: 60's-110's. Frequent multifocal PVC's. First degree HB. BBB.    Multiple short episodes of VTAC, nonsustained including 1 episode of 8 beats of VTAC.    1 episode of Afib with a bundle branch block.     During impella repositioning, pt went into vfib. Delivered 1 shock.     .21/.14/.40

## 2022-12-30 NOTE — CARE PLAN
The patient is Unstable - High likelihood or risk of patient condition declining or worsening    Shift Goals  Clinical Goals: Increase LOC, TTM 33* C, BSAS < 1, decrease vasopressor support, tolerate impella.  Patient Goals: KENDY.    Progress made toward(s) clinical / shift goals:  Pt's LOC decreased throughout the night. Pt reached goal TTM of 33* C. Followed TTM protocol to maintain a BSAS < 1. Increased vasopressor support to max doses by the end of shift. Pt tolerated Impella.     Patient is progressing towards the following goals:    Problem: Pain - Standard  Goal: Alleviation of pain or a reduction in pain to the patient’s comfort goal  Outcome: Progressing  Note: PRN fentanyl IV push administered when CPOT > 2.      Problem: Safety - Medical Restraint  Goal: Remains free of injury from restraints (Restraint for Interference with Medical Device)  Outcome: Progressing  Flowsheets (Taken 12/30/2022 0617)  Addressed this shift: Remains free of injury from restraints (restraint for interference with medical device):   Evaluate the patient's condition at the time of restraint application   Inform patient/family regarding the reason for restraint   Every 2 hours: Monitor safety, psychosocial status, comfort, nutrition and hydration

## 2022-12-30 NOTE — ASSESSMENT & PLAN NOTE
Due to anterior STEMI - 100% occlusion of prox LAD s/p PCI AMANDA x1 with RUBEN 3  LVEF 20%, LVEDP 20  S/p impella RFA. P9  On NE gtt and EPI gtt    Continue impella at P9. Heparin gtt   Monitor for alarms/hematuria. If +hematuria, needs re-measurement of the impella, contact cardiology    Continue NE gtt and EPI gtt  Goal to keep MAP >65  CI >2.2

## 2022-12-30 NOTE — H&P
ICU History & Physical Note    Date of Admission: 12/29/2022  Admission Status: Inpatient    ID  67M with a h/o ETOH use (28 glasses of wine + 5 beers per week) who presented with a STEMI on 12/29/2022.    HPI  Ramakrishna Ahuja is a 67 year old male  with a h/o Hypothyroidism, HLD, OA, Bilateral Inguinal Hernia (2013), and ETOH use (28 glasses of wine + 5 beers per week).     He presented with a STEMI on 12/29.     In detail, pt was reported to have CP while cleaning gutters. He also had significant N/V. When EMS arrived, he was given SL NTG which did improve his CP.     See hospital course below.     #Neuro  -pt is sedated    #Respiratory  -pt is on the vent (440/24/60/10)    #Cardiovascular  -Norepi = 0.15   -Vaso = off  -EPI = 0.03     #Nephro  -Cr = 1.29 (baseline ~1.07)  -UO = pending    #FEN/GI  -NPO while intubated  -LR = 100 mL/hr while NPO  -bowel reg    #Heme/ID  -WBC = 11.6   -Hgb unremarkable    #Endo  -glucose < 180      Review of Systems  -unable to obtain due to sedation and ETT    Past Medical History (obtained from family)  -see HPI  -denies childhood illness/chronic illnesses/congenital illnesses    Past Surgical History  -see HPI    Medications     Prior to Admission Medications   Prescriptions Last Dose Informant Patient Reported? Taking?   Misc Natural Products (COMPLETE PROSTATE HEALTH) TABS   Yes No   Sig: Take  by mouth.   Multiple Vitamin (MULTIVITAMIN) capsule   Yes No   Sig: Take  by mouth.   pravastatin (PRAVACHOL) 20 MG Tab   No No   Sig: Take 1 Tablet by mouth every evening.   Patient not taking: Reported on 12/29/2022      Facility-Administered Medications: None        Allergies    Allergies   Allergen Reactions    Penicillins        Family History    family history includes Cancer in his mother and another family member; Diabetes in an other family member; Stroke in an other family member.     Social History  -see HPI   Tobacco: denies   Recreational drugs (illegal and  prescription):  denies   Primary Care Provider: reviewed Teddy Villela M.D.  Other (stressors, spirituality, exposures):  pt is not a Hinduism, pt is not a vegetarian    Consultants  -Cardiology (Cherry)  -Interventional Cardiology (Flore[place])    Physical Exam    Vitals:  Temp:  [36.3 °C (97.3 °F)-36.4 °C (97.6 °F)] 36.3 °C (97.3 °F)  Pulse:  [0-199] 110  Resp:  [] 24  BP: ()/(41-98) 140/92  SpO2:  [37 %-100 %] 98 %    -General: ETT, [place] (with hematuria), L A-line, Impella in R groin, R [place]  -Psych: pt is calm, neutral affect  -Head: no skull deformities, no scalp lacerations   -Eyes: EOMI, no mydriasis or miosis  -ENT: good oral hygeine, no nasal septal deviation  -Cardio: no murmurs or gallops, cool extremities  -Resp: coarse breath sounds, symmetric expansion  -Abd: soft and nontender, not distended, no guarding or rebound  -Neuro: no FND, DARLING  -Skin: no rashes, no lacerations noted  -MSK: no gross bone deformities, no hematomas noted      Data:  -Trop = 11 -->  1271  -Plt = 310  -A1c = 5.6%  -LDL = 143  -TSH = 11.2    -CT head = negative   -CXR  = mild L basilar opacity  -Stress test (2012) = negative   -EKG = ST elevataions in V2-V3  -EEG = no sz  -TEG = long R time, low MA    Assessment/Plan  Ramakrishna Ahuja is a 67 year old male  with a h/o Hypothyroidism, HLD, OA, Bilateral Inguinal Hernia (2013), and ETOH use (28 glasses of wine + 5 beers per week).     He presented with a STEMI on 12/29.       Hospital Course:  -12/29 - In the ED, while he was conversing with providers, pt's rhythm converted to VT. At this point, CPR was started and after DCCV x1, ROSC was achieved.  Pt was then emergently taken to the cath lab where he was found to have a 100% LAD occlusion. Intraoperative course was c/b cardiac arrest requiring CPR, but  thankfully, once again ROSC was achieved and AMANDA x1 was placed. Pt was then sent to the ICU with an Impella. Unfortunately, some sz like activity was noted  after pt's LHC. FT3/FT4/NSE/proBNP/ABG/Echo/SSEP/q6hr trop = pending. Pt's prognosis is uncertain.    ----------------------------------------------------------------------------------------------------------------------  #STEMI c/b V-tach and Cardiogenic Shock  -s/p AMANDA to the LAD on 12/29  -[facility]] Sun for TTM (started 12/29)  -Norepi = 0.15   -Vaso = off   -EPI = 0.03  -Amio = 1  -c/w home ASA    -Prasugrel for DAPT x1 year  -high intensity statin  -beta blocker and ACEI can be considered at a later date when pt is out of the ICU    #Hypothyroidism  -c/w home Levothyroxine    #CXR Opacities in the LLL  (Clinical history of N/V suggests that aspiration PNA might be possible)  -if pt spikes a fever or develops worsening hypoxia, abx could be considered          -DVT ppx = holding due to hematuria  -Code Status = Full Code     Dispo = pending Impella removal, pressor wean and Cardiology recs     Ayan Suresh, PGY2  Internal Medicine Residency with [employer]     Plan has been discussed with Attending Physician.

## 2022-12-30 NOTE — THERAPY
Physical Therapy Contact Note    PT cardiac rehab consult received; per chart review now transitioning to comfort care; no role for cardiac rehab eval identified; will complete order; please reconsult should goals of care change.    Chinyere HURST, PT,  [phone]

## 2022-12-30 NOTE — PROGRESS NOTES
"Interval History:  67-year-old patient presents with acute anterior STEMI and cardiogenic shock and witnessed VT/VF/PEA cardiac arrests.  Status post emergency Impella placement and primary PCI to single-vessel CAD 12/29.  12/30: Significant overnight cardiac events including Impella repositioning requirements both at bedside and in Cath Lab and [place]-Samira catheter replacement due to breach of the sterile cover.  Significant hemoglobin drop post transport to and from the Cath Lab now critical.  Remains on high-dose vasopressor and moderate dose inotropic support with maximum support Impella.    Physical Exam   Blood pressure (!) 74/51, pulse 65, temperature (!) 32.8 °C (91 °F), temperature source Core, resp. rate (!) 30, height 1.778 m (5' 10\"), SpO2 (!) 18 %.    Constitutional: Intubated and sedated.  Appears well-developed.   HENT: Normocephalic and atraumatic. No scleral icterus.   Neck: No JVD present.   Cardiovascular: Normal rate. Exam reveals no gallop and no friction rub. No murmur heard.   Pulmonary/Chest: Coarse   Abdominal: S/NT/ND BS+   Musculoskeletal: Exhibits edema. Pulses present.  Right  Femoral hematoma.  Skin: Skin is cool and dry.     ROS: Unable to obtain, patient intubated and sedated unable to communicate effectively.        Intake/Output Summary (Last 24 hours) at 12/30/2022 0940  Last data filed at 12/30/2022 0900  Gross per 24 hour   Intake 4458.07 ml   Output 451 ml   Net 4007.07 ml       Recent Labs     12/29/22  1840 12/30/22  0437 12/30/22  0855   WBC 24.3* 22.8* 19.5*   RBC 4.48* 3.23* 1.99*   HEMOGLOBIN 14.1 10.4* 6.2*   HEMATOCRIT 41.7* 31.0* 20.0*   MCV 93.1 96.0 100.5*   MCH 31.5 32.2 31.2   MCHC 33.8 33.5* 31.0*   RDW 43.8 45.5 47.8   PLATELETCT 310 255 159*   MPV 9.6 9.8 9.9     Recent Labs     12/29/22  1840 12/30/22  0000 12/30/22  0431   SODIUM 141 135 139   POTASSIUM 2.5* 3.4* 3.0*   CHLORIDE 103 100 100   CO2 19* 17* 10*   GLUCOSE 295* 441* 485*   BUN 18 19 20   CREATININE " 1.13 1.64* 1.69*   CALCIUM 7.6* 8.6 7.5*     Recent Labs     12/30/22  0000 12/30/22  0433   APTT 146.8* >240.0*   INR 1.41* 1.42*             Recent Labs     12/29/22  1840   TRIGLYCERIDE 111   HDL 41   *       No current facility-administered medications on file prior to encounter.     Current Outpatient Medications on File Prior to Encounter   Medication Sig Dispense Refill    pravastatin (PRAVACHOL) 20 MG Tab Take 1 Tablet by mouth every evening. (Patient not taking: Reported on 12/29/2022) 90 Tablet 3    Misc Natural Products (COMPLETE PROSTATE HEALTH) TABS Take  by mouth.      Multiple Vitamin (MULTIVITAMIN) capsule Take  by mouth.         Current Facility-Administered Medications   Medication Dose Frequency Provider Last Rate Last Admin    LIDOCAINE HCL 1 % INJ SOLN            SODIUM BICARBONATE 8.4 % IV SOLN            insulin regular (HumuLIN R) 100 Units in  mL Infusion  0-29 Units/hr PRN RICHIE Noe.O. 15 mL/hr at 12/30/22 0857 15 Units/hr at 12/30/22 0857    And    dextrose 10 % BOLUS 12.5-25 g  12.5-25 g PRN Magdy Graham D.O.        EPINEPHrine (Adrenalin) infusion 16 mg/500 mL (premix)  0-0.5 mcg/kg/min (Ideal) Continuous Deanna Chung M.D. 136.9 mL/hr at 12/30/22 0938 1 mcg/kg/min at 12/30/22 0938    norepinephrine (Levophed) infusion 32 mg/500 mL (premix)  0-1 mcg/kg/min (Ideal) Continuous Deanna Chung M.D. 68.4 mL/hr at 12/30/22 0809 1 mcg/kg/min at 12/30/22 0809    PHENYLEPHRINE HCL-NACL 1-0.9 MG/10ML-% IV SOSY            potassium chloride (KCL) ivpb 10 mEq  10 mEq Once DAVID NoeOArabella        DOBUTamine (Dobutrex) 1 mg/1 mL premix infusion  2.5 mcg/kg/min (Ideal) Continuous Florina Kennedy M.D. 21.9 mL/hr at 12/30/22 0828 5 mcg/kg/min at 12/30/22 0828    sodium bicarbonate 8.4 % 150 mEq in sterile water 1,000 mL infusion  150 mEq Continuous Florina ORLANDO Kennedy M.D. 250 mL/hr at 12/30/22 0745 Rate Change at 12/30/22 0745    midazolam (Versed) injection 2 mg  2 mg Q HOUR PRN  Florina Kennedy M.D.        CALCIUM CHLORIDE 10 % IV SOLN            senna-docusate (PERICOLACE or SENOKOT S) 8.6-50 MG per tablet 2 Tablet  2 Tablet BID Magdy Graham D.O.        And    polyethylene glycol/lytes (MIRALAX) PACKET 1 Packet  1 Packet QDAY PRN Magdy Graham D.O.        And    magnesium hydroxide (MILK OF MAGNESIA) suspension 30 mL  30 mL QDAY PRN Magdy Graham D.O.        And    bisacodyl (DULCOLAX) suppository 10 mg  10 mg QDAY PRN Magdy Graham D.O.        Respiratory Therapy Consult   Continuous RT DAVID NoeO.        acetaminophen (Tylenol) tablet 650 mg  650 mg Q6HRS PRN Elio Don M.D.        prasugrel (EFFIENT) tablet 10 mg  10 mg DAILY Elio Don M.D.        heparin infusion 25,000 Units in 500 mL 0.45% NACL  0-1,000 Units/hr Continuous Elio Don M.D. 7.5 mL/hr at 12/30/22 0925 375 Units/hr at 12/30/22 0925    And    heparin 25 units/mL in 500mL D5W infusion (for use with IMPELLA Pump)   Units/hr Continuous Elio Don M.D. 19 mL/hr at 12/30/22 0925 475 Units/hr at 12/30/22 0925    Cold NS infusion 1,606 mL  20 mL/kg Once RICHIE Noe.O.        Cold NS infusion 803 mL  10 mL/kg Once PRN RICHIE Noe.O.        magnesium sulfate 20 g/500mL infusion  0.5-2 g/hr Continuous Magdy Graham D.O. 13 mL/hr at 12/30/22 0650 0.5 g/hr at 12/30/22 0650    meperidine (DEMEROL) injection 25 mg  25 mg Q HOUR PRN Magdy Graham D.O.   25 mg at 12/29/22 2353    fentaNYL (SUBLIMAZE) injection 100 mcg  100 mcg Q HOUR PRN Magdy Graham D.O.   100 mcg at 12/29/22 2244    fentanyl 50 mcg/mL infusion   mcg/hr Continuous Magdy Graham, D.O. 2 mL/hr at 12/30/22 0829 100 mcg/hr at 12/30/22 0829    midazolam (Versed) injection 2 mg  2 mg Once Magdy Graham, D.O.        Followed by    midazolam (Versed) premix 125 mg/125 mL infusion  0-10 mg/hr Continuous Magdy Graham, D.O.   Dose not Required at 12/29/22 2000    vecuronium (NORCURON) injection 7.3 mg  0.1 mg/kg (Ideal) Once Magdy Graham, D.O.         Followed by    vecuronium (NORCURON) 60 mg in dextrose 5% 500 mL Infusion  1-2 mcg/kg/min (Ideal) Continuous DAVID NoeOArabella        artificial tears (EYE LUBRICANT) ophth ointment 1 Application  1 Application Q8HRS Magdy Graham D.O.        K+ Scale: Goal of 4.5  1 Each Q6HRS RICHIE Noe.O.   1 Each at 12/30/22 0551    sodium phosphate 15 mmol in dextrose 5% 250 mL ivpb  15 mmol Once PRN RICHIE Noe.O.        Or    sodium phosphate 30 mmol in dextrose 5% 500 mL ivpb  30 mmol Once PRN RICHIE Noe.O.        aspirin (ASA) chewable tab 81 mg  81 mg DAILY DAVID NoeO.        labetalol (NORMODYNE/TRANDATE) injection 10 mg  10 mg Q4HRS PRN Ayan Suresh M.D.        levothyroxine (SYNTHROID) tablet 88 mcg  88 mcg AM ES Ayan Suresh M.D.        atorvastatin (LIPITOR) tablet 80 mg  80 mg Q EVENING Ayan Suresh M.D.        amiodarone (Nexterone) 360 mg/200 mL infusion  0.5-1 mg/min Continuous DAVID NoeO. 17 mL/hr at 12/30/22 0747 0.5 mg/min at 12/30/22 0747   Last reviewed on 12/29/2022 11:21 AM by Pricila Clayton D.O.   Medications reviewed    Imaging reviewed    Impressions:  1.  Witnessed cardiac arrest  2.  Anterior STEMI status post primary PCI  3.  Cardiogenic shock with mechanical circulatory support  4.  Acute anemia  5.  Multiorgan system dysfunction  6.  Acute kidney injury  7.  Ventilator dependent respiratory failure  8.  Lactic acidosis secondary to above    Recommendations:  Unfortunately this gentleman remains critically ill and has a very high predicted mortality overall at presentation and subsequent supportive efforts have been met with limited efficacy.  Combination of blood loss and hemolysis for his anemia.  I recommend emergency transfusion of packed red blood cells noted the Impella is repositioned.  Continue aggressive hemodynamically titrated support with dobutamine epinephrine and norepinephrine and maximum Impella support as tolerated.    Recommendations as above however  given that the patient's MPOA/wife would like to withdraw care recommend instead following the family's and patient's wishes with regard to his goals of care.  This is very reasonable given his dismal prognosis.    Discussed with primary physician.

## 2022-12-30 NOTE — PROCEDURES
INPATIENT ROUTINE VIDEO ELECTROENCEPHALOGRAM REPORT      REFERRING PROVIDER: Dr. Graham    DOS: 12/29/2022     TOTAL RECORDING TIME: 0 hours and 32 minutes of total recording time    INDICATION:  Ramakrishna Ahuja 67 y.o. male presenting with seizure(s) and altered mental status    CURRENT ANTI-SEIZURE MEDICATIONS:   Propofol (100mg)  Fentanyl (50mcg)  Versed (25mg)  propofol  (20mcg/kg/hr)    TECHNIQUE: Routine VEEG was set up by a Neurodiagnostic technologist who performed education to the patient and staff. A minimum of 23 electrodes and 23 channel recording was setup and performed by Neurodiagnostic technologist, in accordance with the international 10-20 system. The study was reviewed in bipolar and referential montages. The recording examined the patient in the  awake, drowsy, and sleep state(s).     DESCRIPTION OF THE RECORD:  During maximal wakefulness, the background was continuous, symmetrical, and showed a 92 Hz posterior dominant rhythm .  The background was composed of a mixture of fast and slow frequencies. Reactivity  was seen. State changes were seen.  During drowsiness, a loss of myogenic artifact  and theta/delta frequencies were seen.     EEG Sleep: Sleep was captured and was characterized by diffuse background delta/theta activity with a loss of myogenic artifact.  N2 sleep transients in the form of sleep spindles and vertex waves were seen in the leads over the central regions.       ACTIVATION PROCEDURES:   Intermittent Photic stimulation was performed in a stepwise fashion from 1 to 30 Hz and did not elicited any abnormalities on EEG.     ICTAL AND INTERICTAL FINDINGS:   No focal or generalized epileptiform activity noted.     No regional slowing was seen during this routine study.      No definite electrographic or electroclinical seizures.     EKG: Sampling of the EKG recording showed an abnormal rhythm      EVENTS:  None    INTERPRETATION:   Abnormal video EEG recording in the awake, drowsy,  and sleep state(s):  - Mild background slowing with hints of reactivity suggestive of mild diffuse/multifocal cerebral dysfunction and consistent with a non-specific encephalopathy. The effects of sedation may be contributing. Clinical correlation recommended.  - No persistent focal asymmetries seen.  - No epileptiform discharges or other epileptiform phenomena seen.   - No seizures. Clinical correlation is recommended.      Note: This EEG does not rule the possibility of seizures  If the clinical suspicion remains high for seizures, a prolonged recording to capture clinical or subclinical events may be helpful.        Arash Paul MD  Department of Neurology at [facility]  General Neurologist and Epileptologist  Director of [facility] Level III Comprehensive Epilepsy Program  Professor of Clinical Neurology, [facility].   Phone: [phone]  Fax: [phone]  E-mail: [email or web address]

## 2022-12-30 NOTE — PROGRESS NOTES
Pulmonary/Critical Care Medicine   Progress Note    Date of service: 12/30/2022  Time: 0140    Notified of new lab findings that lacate increased to 9.6 from 4 with a markedly elevated troponin.  Impella alarming at P8-->bolus of 500cc of LR given.      Came to bedside and noted patient hypotensive with systolic BP in 70-80s. Reviewed midnight [place] numbers. Impella alarming again and dropped to P7 and then P6.  Call out to cardiology who recommended call to rep.  Rep suggested STAT ECHO for repositioning.  In meantime, increasing epinephrine drip for hypotension.  No significant change to MAP.  No UOP.  Noted more ectopy with increased epinephrine drip.  Bolus of amiodarone given.  ABG drawn showing significant metabolic acidosis.  2 amps bicarb given with improvement to hemodynamics.  Bicarb drip started.    I spent extensive time in reviewing the patient's condition, physical examination, laboratory and imaging data, prior documentation, in discussion with RNs, RT, and pharmacy in formulating an assessment/plan.    Critical Care time: 50 min. No time overlap, procedures not included in time.  [id number]

## 2022-12-30 NOTE — ASSESSMENT & PLAN NOTE
Recurrent VT/VF arrests s/p multiple shocks.   Likely due to anterior STEMI, prox LAD occlusion s/p PCI AMANDA x1 with good RUBEN 3  Bolused amio 300 x1, will continue amiodarone gtt   Keep K >4 and Mg >2

## 2022-12-30 NOTE — PROCEDURES
Cardiac Catheterization report    12/29/2022  6:03 PM    REFERRING MD: Dr. Carey    INDICATION/PREOPERATIVE DIAGNOSIS:  Anterior ST elevation myocardial infarction  Witnessed cardiac arrest  Cardiogenic shock    POSTOPERATIVE DIAGNOSIS:  100% thrombotically occluded proximal LAD single-vessel disease  LVEF 20%, LVEDP 20 mmHg, severe mitral regurgitation  Successful pre-PCI Impella CP placement via RFA   Successful salvage PCI proximal LAD (3.5 x 15 mm Braham AMANDA), excellent result  Intraprocedural ACLS  Successful emergency right heart catheterization      RECOMMENDATIONS:  Guideline directed medical therapy   Cardiovascular Risk factor modification  Dual antiplatelet therapy for minimum 1 year      PROCEDURES PERFORMED:  Coronary arteriograms  Left heart catheterization, Left ventriculogram, and Right heart catheterization   Supervision moderate sedation  Percutaneous coronary intervention LAD  Intravascular ultrasound LAD  ACLS intraprocedural  Impella CP placement  Right heart cath via [facility]      FINDINGS:  I.  HEMODYNAMICS  Ao: 76/60, LVEDP 20 mmHg, PA 34/22, mean PA 28 mmHg, pulm capillary pressure 19 mmHg, right atrial pressure 13 mmHg, JOYCE 1.0    II. CORONARY ANGIOGRAPHY:  Left main coronary artery: Large caliber bifurcating no CAD  Left anterior descending artery: Large-caliber 100% thrombotically occluded at its ostium.  Post PCI there is 0% residual stenosis and RUBEN-3 flow to the apex.  Left circumflex coronary artery: Moderate caliber no CAD  Right coronary artery: Large-caliber dominant no CAD    III.  LEFT VENTRICULOGRAM:  LVEF COOPER PROJECTION: 15-20%      Procedure details:  The risks and benefits of cardiac catheterization and possible intervention were explained to the patient including death, heart attack, stroke, and emergency surgery.  The patient was brought to the cardiac catheterization lab where the right groin was prepped and draped in the usual manner for cardiac catheterization.  The area was  "anesthetized with lidocaine and a 5/6 FR sheath was inserted into the right femoral artery without difficulty under direct ultrasound visualization.  Subsequently a 6 Georgian angled pigtail catheter was used to cross the aortic valve.  Images were obtained and left trigger was completed showing severe mitral regurgitation and an EF of 15 to 20%.  Decision was made to place.  PCI Impella CP for support and ventricular unloading.  The Impella CP sheath was placed over heavyweight wire and the correct fluoroscopic guidance.  Through the sheath pigtail catheter used to cross aortic valve and the Impella wire was placed in the ventricular apex.  Subsequently Impella CP attaining an adequate ACT was placed in the ventricular apex and position was confirmed.  Subsequently single access technique was utilized through the Impella placement sheath with a 7 Georgian medical destination sheath.  Through this an EBU 3.5 guide was inserted into the left main and angiographic evaluation was completed..  After intervention was completed the JR4 was inserted and the RCA was evaluated.    Description of PCI:  The decision was made to intervene on the culprit coronary artery.  Anticoagulation was initiated as below.  The diagnostic catheter was exchanged over a wire for an EBU 3.5 guide seated appropriately. A 0.014\" mm  Runthrough was advanced into the artery and use to cross the lesion. A 3.0 mm balloon was used to predilate the lesion. A 3.0 x 15 mm Paulino AMANDA was then positioned and deployed at nominal pressure. Following this a 3.5 mm noncompliant balloon high-pressure was used to post dilate the stent.-  Intravascular sound cath was advanced distal lesion amenable back recorder showing excellent apposition expansion.  There was an excellent angiographic result with RUBEN-3 flow and no residual stenosis in the stented segment.  Several rounds of CPR and ACLS for administered for intermittent PEA and VT VF.  At the completion of the " intervention the right IJ was localized ultrasonography and an 8 [place] guide placed and entered [place] catheter was advanced to the right heart and the pulmonary circulation for right heart catheterization and sewn in place.      Anticoagulant: Heparin  Antiplatelet: Effient (prasugrel)  EBL <25 cc  Complications: none  Specimens: none  Contrast: 100cc    Complications:  None apparent    Sedation time:  Moderate sedation directly monitored by me during the case while supervising the administration of the sedation medication by an independent trained RN to assist in the monitoring of the patient's level of consciousness and physiological status. I, the supervising physician was present the entire time from beginning of medication administration until the end of the procedure from 1646 until 1752. For detailed administration records please see the moderate sedation documentation in the media tab.    Following the procedure I discussed the results with the patient and the patients designated contact/family member immediately following the procedure.    Elio Don MD, FACC, [facility]

## 2022-12-30 NOTE — CONSULTS
Critical Care H&P    Date of consult: 12/29/2022    Consulting Physician  Dr. Harper    Reason for Consultation  STEMI, cardiac arrest    History of Presenting Illness  68 yo male with hx of hypothyroidism who presented with STEMI. Pt c/o crushing chest pain while cleaning his gutter. Pt was brought to ED, pt went into VT, then lost a pulse. Pt was cardioverted at 200J, amiodarone bolus 150mg and 1mg of epi. Pt received ROSC. Emergently intubated by ED, post intubation, pt unfortunately went into PEA again and had 2 rounds of CPR before ROSC was achieved. Cardiology was consulted and emergently took pt to cath lab. Pt was loaded with heparin bolus, ASA 325mg.    In cath lab, pt has 100% occlusion of proximal LAD s/p PCI AMANDA x1 with RUBEN 3, had recurrent cardiac arrest multiple times with PEA/VT/VF s/p shock x4. Multiple amiodarone boluses. LVEF 20%, LVEDP 20 s/p impella via right femoral artery  On norepinephrine and epinephrine.     Upon ICU arrival, pt intubated, FIO2 100%, PEEP 14. PIP 21/Pplat 19. Synchronizing well. On impella, P9. Impella flow 3.1L/min. On NE gtt at 0.05 mcg/kg/min and EPI gtt at 0.03 mcg/kg/min. Echo tech was at bedside to measure the impella placement. Distance between aortic valve and inlet is 2.5cm (ideal 3.5cm). I was informed that in cath lab, attempt to advance the impella further resulted in VF/VT arrest. No hematuria at this time      Code Status  Full Code    Review of Systems  Review of Systems   Reason unable to perform ROS: intubated, sedated, RASS -4, unable to perform.     Past Medical History   has a past medical history of Alcohol use, Angina, Chest pain (5/14/2012), and Murmur (1978).    Surgical History   has a past surgical history that includes tonsillectomy (1960) and inguinal hernia laparoscopic bilateral (8/21/2013).    Family History  family history includes Cancer in his mother and another family member; Diabetes in an other family member; Stroke in an other  family member.    Social History   reports that he has never smoked. He has never used smokeless tobacco. He reports current alcohol use of about 19.8 oz per week. He reports that he does not use drugs.    Medications  Home Medications    **Home medications have not yet been reviewed for this encounter**       Current Facility-Administered Medications   Medication Dose Route Frequency Provider Last Rate Last Admin    AMIODARONE HCL IN DEXTROSE 150-4.21 MG/100ML-% IV SOLN             NS infusion   Intravenous Continuous Imtiaz Mccall M.D.        BIVALIRUDIN TRIFLUOROACETATE 250 MG IV SOLR              Current Outpatient Medications   Medication Sig Dispense Refill    pravastatin (PRAVACHOL) 20 MG Tab Take 1 Tablet by mouth every evening. (Patient not taking: Reported on 12/29/2022) 90 Tablet 3    Misc Natural Products (COMPLETE PROSTATE HEALTH) TABS Take  by mouth.      Multiple Vitamin (MULTIVITAMIN) capsule Take  by mouth.         Allergies  Allergies   Allergen Reactions    Penicillins        Vital Signs last 24 hours  Pulse:  [0-199] 102  Resp:  [] 25  BP: ()/(41-98) 152/98  SpO2:  [37 %-100 %] 77 %    Physical Exam  Physical Exam  Vitals and nursing note reviewed.   Constitutional:       General: He is not in acute distress.     Appearance: He is not ill-appearing, toxic-appearing or diaphoretic.      Comments: Intubated, not awake   HENT:      Head: Normocephalic and atraumatic.      Mouth/Throat:      Mouth: Mucous membranes are moist.      Comments: ET tube in place  Cardiovascular:      Rate and Rhythm: Normal rate.      Pulses: Normal pulses.      Heart sounds: Normal heart sounds. No murmur heard.     Comments: Impella in place with cath in RFA  Pulmonary:      Effort: Pulmonary effort is normal. No respiratory distress.      Breath sounds: Normal breath sounds. No wheezing or rales.   Abdominal:      General: There is no distension.      Palpations: Abdomen is soft.      Tenderness: There is  no abdominal tenderness.   Musculoskeletal:      Right lower leg: No edema.      Left lower leg: No edema.   Skin:     General: Skin is dry.      Capillary Refill: Capillary refill takes more than 3 seconds.      Coloration: Skin is not jaundiced.      Comments: Cold clammy   Neurological:      Comments: Intubated       Fluids  No intake or output data in the 24 hours ending 22 9244    Laboratory  Recent Results (from the past 48 hour(s))   Complete Metabolic Panel (CMP)    Collection Time: 22  4:16 PM   Result Value Ref Range    Sodium 142 135 - 145 mmol/L    Potassium 2.9 (L) 3.6 - 5.5 mmol/L    Chloride 105 96 - 112 mmol/L    Co2 20 20 - 33 mmol/L    Anion Gap 17.0 (H) 7.0 - 16.0    Glucose 127 (H) 65 - 99 mg/dL    Bun 18 8 - 22 mg/dL    Creatinine 1.29 0.50 - 1.40 mg/dL    Calcium 8.8 8.5 - 10.5 mg/dL    AST(SGOT) 29 12 - 45 U/L    ALT(SGPT) 29 2 - 50 U/L    Alkaline Phosphatase 62 30 - 99 U/L    Total Bilirubin 0.6 0.1 - 1.5 mg/dL    Albumin 4.1 3.2 - 4.9 g/dL    Total Protein 7.2 6.0 - 8.2 g/dL    Globulin 3.1 1.9 - 3.5 g/dL    A-G Ratio 1.3 g/dL   Troponins NOW    Collection Time: 22  4:16 PM   Result Value Ref Range    Troponin T 11 6 - 19 ng/L   ESTIMATED GFR    Collection Time: 22  4:16 PM   Result Value Ref Range    GFR (CKD-EPI) 61 >60 mL/min/1.73 m 2   CORRECTED CALCIUM    Collection Time: 22  4:16 PM   Result Value Ref Range    Correct Calcium 8.7 8.5 - 10.5 mg/dL   EKG    Collection Time: 22  4:22 PM   Result Value Ref Range    Report       [facility] Emergency Dept.    Test Date:  2022  Pt Name:    TANG VASQUEZ                 Department: ER  MRN:        [id number]                      Room:       TRAUMA - EXAM 1  Gender:     Male                         Technician: [id number]  :        1955                   Requested By:PHIL KLEIN  Order #:    [id number]                    Reading MD:    Measurements  Intervals                                 Axis  Rate:       144                          P:          0  HI:                                      QRS:        -64  QRSD:       142                          T:          64  QT:         338  QTc:        523    Interpretive Statements  Sinus tachycardia  Left bundle branch block  Compared to ECG 08/19/2013 14:16:05  Left bundle-branch block now present  Sinus rhythm no longer present  Left anterior fascicular block no longer present         Imaging  CT-HEAD W/O   Final Result      1.  No acute intracranial process identified.   2.  The intracranial vasculature is grossly patent with no abnormal enhancement in the brain parenchyma.         DX-CHEST-LIMITED (1 VIEW)   Final Result      1.  Mild diffuse soft tissue prominence which could indicate edema and/or infection.   2.  Bilateral basilar atelectasis and/or consolidation.   3.  Endotracheal tube terminates just above the level of the aortic arch in satisfactory position.   4.  Enteric feeding tube courses towards the stomach with the distal tip not visualized.      CL-LEFT HEART CATHETERIZATION WITH POSSIBLE INTERVENTION    (Results Pending)   CL-LEFT HEART CATHETERIZATION WITH POSSIBLE INTERVENTION    (Results Pending)   DX-CHEST-PORTABLE (1 VIEW)    (Results Pending)   EC-ECHOCARDIOGRAM COMPLETE W/O CONT    (Results Pending)       Assessment/Plan  * Cardiac arrest (HCC)  Assessment & Plan  S/p PEA/VT/VF x2, s/p shock CPR x2 rounds in ED, recurrent PEA/VT/VF s/p shock x4, CPR x2 rounds in cath lab  Bolused amiodarone total 300mg.   Intubated in ED.   CT head negative for head bleed    Plan:   Start TTM 33C x 24 hours then rewarm. Avoid fever  Code chills protocol   cEEG  NSE/SSEP  Continue NE and EPI gtt  Continue impella  Monitor electrolytes.   Will not neuro prognosticate for at least 72 hours post cardiac arrest. Unless pt wakes up and purposeful within these hours    CAD (coronary artery disease)  Assessment & Plan  S/p LHC with prox LAD occlusion  s/p PCI AMANDA x1    VT (ventricular tachycardia)  Assessment & Plan  Recurrent VT/VF arrests s/p multiple shocks.   Likely due to anterior STEMI, prox LAD occlusion s/p PCI AMANDA x1 with good RUBEN 3  Bolused amio 300 x1, will continue amiodarone gtt   Keep K >4 and Mg >2    Cardiogenic shock (HCC)  Assessment & Plan  Due to anterior STEMI - 100% occlusion of prox LAD s/p PCI AMANDA x1 with RUBEN 3  LVEF 20%, LVEDP 20  S/p impella RFA. P9  On NE gtt and EPI gtt    Continue impella at P9. Heparin gtt   Monitor for alarms/hematuria. If +hematuria, needs re-measurement of the impella, contact cardiology    Continue NE gtt and EPI gtt  Goal to keep MAP >65  CI >2.2            Discussed patient condition and risk of morbidity and/or mortality with RN, RT, and Charge nurse / hot rounds.    The patient remains critically ill.  Critical care time = 150 minutes in directly providing and coordinating critical care and extensive data review.  No time overlap and excludes procedures.

## 2022-12-30 NOTE — PROGRESS NOTES
04:03: Dr. Rivers at bedside.    04:22 torsades de pointes - defibrillated at 150J. Patient returned to     04:25 Dr. Rivers completed. Requested cath lab be paged urgent and called in. ER charge notified

## 2022-12-30 NOTE — PROCEDURES
PROCEDURES PERFORMED:   --Impella repositioning under direct fluoroscopic and echocardiographic guidance  -- CCO [place] catheter placement, and removal of prior catheter    Procedure start time 5:53 AM  Procedure end time 6:29 AM    DESCRIPTION OF PROCEDURE:  Under direct fluoroscopic guidance, the Impella CP was withdrawn 5 cm, and then re-advanced towards the LV apex with better positioning.  TTE performed in the Cath Lab confirmed Impella to be in good placement as well at the end of the procedure.  Final placement marker 95 cm at the hub.  No complications.    Under fluoroscopic guidance, the PA catheter was deep into the pulmonary vasculature and when I tried to pull back the catheter, the sterile protective cover ripped hence I had to replace the CCO swan catheter, which was done under fluorsoscopic and pressure measurement guidance.    CVP: 5mmHg  RV: 22/5mmHg  PA: 23/13/16mmHg  PCWP: 10mmHg  PA sat 39% with PA pH 6.99. istat K 2.5  Anamaria CO: 2.8 L/min  Anamaria CI: 1.4 L/min.m2    Impression:  -Normal to low filling pressures  -Severely reduced cardiac output and cardiac index consistent with his pre-existing cardiogenic shock state  -Impella dependent cardiac output  -Hematoma around the Impella access site was stable (soft) at the end of the case.  See prior procedure note for more details in that regards.    Recommendations:  -IV fluids, target CVP 10mmHg and PCWP 15-20mmHg  -CCO [place] guided inotropic and vasopressor management; consider starting dobutamine and increasing Impella support to P8  -Continued critical care management by ICU team with consultant cardiology team    Discussed with Dr. Zoë Kennedy and ICU nursing team.    Norm Rivers MD, MPH [facility]  Interventional Cardiologist  [facility]   of Clinical Internal Medicine - [facility] Sebastian SCHAFER

## 2022-12-30 NOTE — PROCEDURES
Central Line Insertion    Date/Time: 2022 11:43 AM  Performed by: Florina Kennedy M.D.  Authorized by: Florina Kennedy M.D.     Consent:     Consent obtained:  Emergent situation  Pre-procedure details:     Hand hygiene: Hand hygiene performed prior to insertion      Sterile barrier technique: All elements of maximal sterile technique followed      Skin preparation:  ChloraPrep    Skin preparation agent: Skin preparation agent completely dried prior to procedure    Sedation:     Sedation type:  None  Anesthesia:     Anesthesia method:  Local infiltration    Local anesthetic:  Lidocaine 1% w/o epi  Procedure details:     Location:  L internal jugular    Patient position:  Trendelenburg    Procedural supplies:  Triple lumen    Catheter size:  7 Fr    Landmarks identified: yes      Ultrasound guidance: yes      Sterile ultrasound techniques: Sterile gel and sterile probe covers were used      Number of attempts:  1    Successful placement: yes    Post-procedure details:     Post-procedure:  Dressing applied and line sutured    Assessment:  Blood return through all ports and free fluid flow    Patient tolerance of procedure:  Tolerated well, no immediate complications  Comments:      Venous placement confirmed by bubble in RA on subcostal view.  Patient  before CXR was completed.

## 2022-12-30 NOTE — ED PROVIDER NOTES
"ED Provider Note    Scribed for Imtiaz Mccall M.D. by Guy Pack. 12/29/2022  4:28 PM    Primary care provider: Teddy Villela M.D.  Means of arrival: EMS  History obtained from: Patient, EMS  History limited by: acuity of illness and subsequent arrest.     CHIEF COMPLAINT  Code STEMI.     HPI  Ramakrishna Ahuja is a 67 y.o. male who presents to the Emergency Department as a code STEMI for worsening chest pain onset 3:15 PM. Per EMS, patient reports he began to have rushing, stabbing 10/10 in severity chest pain at 3:15 PM and called EMS 30 minutes after this began. Furthermore, this pain radiates to his right arm and was accompanied by sweats, nausea and vomiting, and dyspnea. EMS states the patient was vomiting profusely prior to arrival. Patient states he did have sweating, pain radiation to his right arm and nausea and vomiting. EMS and patient report associated sweating, pain radiation to the right arm, nausea, vomiting. There are no exacerbating factors. Patient denies associated fever, chills. He denies a history of hypertension, diabetes or hyperlipidemia. Patient denies a history of smoking.  Patient was both hypoxia and hypotensive in the field.    Patient HPI limited secondary to acuity of illness and subsequent arrest.     Additional Historian: EMS who reports the patient began to have rushing, stabbing 10/10 in severity with sweating, nausea, and vomiting.     REVIEW OF SYSTEMS  Pertinent positives include: chest pain, sweating, pain radiation to the right arm, nausea, vomiting.  Pertinent negatives include: fever, chills.    Patient ROS limited secondary to acuity of illness and subsequent arrest.     PAST MEDICAL HISTORY  Past Medical History:   Diagnosis Date    Alcohol use     \"3 drinks per day\"    Angina     occasional 6/10 on pain scale- none for 2 months/no evaluation    Chest pain 5/14/2012    Murmur 1978       FAMILY HISTORY  Family History   Problem Relation Age of Onset    Cancer Mother        "  breast cancer    Diabetes Other     Stroke Other     Cancer Other        SOCIAL HISTORY  Social History     Tobacco Use    Smoking status: Never    Smokeless tobacco: Never    Tobacco comments:     per pt, smoked approx 6 cigars ever, at parties occasionally   Substance Use Topics    Alcohol use: Yes     Alcohol/week: 19.8 oz     Types: 28 Glasses of wine, 5 Cans of beer per week     Comment: 3 per day    Drug use: No     Social History     Substance and Sexual Activity   Drug Use No       SURGICAL HISTORY  Past Surgical History:   Procedure Laterality Date    INGUINAL HERNIA LAPAROSCOPIC BILATERAL  8/21/2013    Performed by Ovidio Jacob M.D. at SURGERY [facility]    TONSILLECTOMY  1960       CURRENT MEDICATIONS      pravastatin (PRAVACHOL) 20 MG Tab, Take 1 Tablet by mouth every evening. (Patient not taking: Reported on 12/29/2022), Disp: 90 Tablet, Rfl: 3    Misc Natural Products (COMPLETE PROSTATE HEALTH) TABS, Take  by mouth., Disp: , Rfl:     Multiple Vitamin (MULTIVITAMIN) capsule, Take  by mouth., Disp: , Rfl:     ALLERGIES  Allergies   Allergen Reactions    Penicillins        PHYSICAL EXAM  VITAL SIGNS: /88   Pulse (!) 105   Temp 36.3 °C (97.3 °F) (Temporal)   Resp (!) 25   SpO2 98%   Reviewed and tachycardic, afebrile, tachypneic, no hypoxia on supplemental oxygen  Constitutional: Well developed, Well nourished, moderate distress.  HENT: Normocephalic, atraumatic, bilateral external ears normal, moist mocous membranes without erythema, edema, exudate.   Eyes: PERRLA, conjunctiva pink, no scleral icterus.   Cardiovascular: Regular rhythm. Tachycardic. No murmurs, rubs or gallops.  No dependent edema or calf tenderness  Respiratory: Lungs clear to auscultation bilaterally. No wheezes, rales, or rhonchi.  No chest tenderness  Abdominal:  Abdomen soft, non-tender, non distended. No rebound, or guarding.    Skin: Cool, diaphoretic, pale  Genitourinary: No costovertebral angle tenderness.    Musculoskeletal: no deformities.   Neurologic: Alert & oriented x 3, cranial nerves 2-12 intact by passive exam.  No focal deficit noted.  Psychiatric: Affect normal, Judgment normal, Mood normal.     ED COURSE:    4:16 PM - Ordered Differential manual, Morphology, Platelet Estimate, Peripheral Smear Review, Corrected Calcium, eGFR, CXR.     4:17 PM - Patient seen and examined at bedside. Conscious sedation performed.     4:18 PM - Cardioversion performed.     4:19 PM - Patient coded, CPR started.     4:20 PM - Patient treated with 1 mg epinephrine.    4:21 PM - Pulse  check, ROSC achieved, patient treated with amiodarone 150 mg/3 mL.     4:24 PM - Patient treated with rocuronium 100 mg    4:25 PM - Patient intubated. See above note.     4:29 PM - Patient to be treated with heparin 4000 units.    4:33 PM - Paged to trauma bay, patient coded. Patient in PEA, CPR started. Patient treated with 1 mg epinephrine.     4:34 PM - Ordered Troponin, CMP, CBC w/diff.    4:35 PM - Pulse check performed, no pulse, still in PEA, CPR resumed.     4:37 PM - Pulse check performed, ROSC achieved.     4:40 PM - Ordered INR, Lipase, CMP, CBC w/diff, proBrain Natriuretic Peptide NT, troponin, ABG, CL-Left Heart Catheterization w/possible intervention. Patient to be treated with NS IV.     HYDRATION: Based on the patient's presentation of Hypotension the patient was given IV fluids. IV Hydration was used because oral hydration was not adequate alone. Upon recheck following hydration, the patient was improved.     INTERPRETATIONS BY ME:    EKG Interpretation by me    Indication: STEMI    Rhythm: Tachycardic  Rate: 144 at 4:37 PM  Axis: normal  Ectopy: none  Conduction: Left bundle branch block   ST/T Waves: Anterior ST elevations. Reciprocal inferior depressions  Q Waves: none  R Wave progression: normal  Hypertrophy changes: Absent    Clinical Impression: Probable sinus tachycardia, Left bundle branch block with STEMI.      RADIOLOGY:    Final Radiology Report  DX-CHEST-LIMITED (1 VIEW)   Final Result      1.  Mild diffuse soft tissue prominence which could indicate edema and/or infection.   2.  Bilateral basilar atelectasis and/or consolidation.   3.  Endotracheal tube terminates just above the level of the aortic arch in satisfactory position.   4.  Enteric feeding tube courses towards the stomach with the distal tip not visualized.        Radiologist interpretation have been reviewed by me.     LABORATORY Ordered and Reviewed by Me:  Results for orders placed or performed during the hospital encounter of 12/29/22   CBC with Differential   Result Value Ref Range    WBC 11.6 (H) 4.8 - 10.8 K/uL    RBC 4.82 4.70 - 6.10 M/uL    Hemoglobin 15.0 14.0 - 18.0 g/dL    Hematocrit 44.3 42.0 - 52.0 %    MCV 91.9 81.4 - 97.8 fL    MCH 31.1 27.0 - 33.0 pg    MCHC 33.9 33.7 - 35.3 g/dL    RDW 43.1 35.9 - 50.0 fL    Platelet Count 338 164 - 446 K/uL    MPV 9.6 9.0 - 12.9 fL    Neutrophils-Polys 33.60 (L) 44.00 - 72.00 %    Lymphocytes 60.80 (H) 22.00 - 41.00 %    Monocytes 4.80 0.00 - 13.40 %    Eosinophils 0.80 0.00 - 6.90 %    Basophils 0.00 0.00 - 1.80 %    Nucleated RBC 0.00 /100 WBC    Neutrophils (Absolute) 3.90 1.82 - 7.42 K/uL    Lymphs (Absolute) 7.05 (H) 1.00 - 4.80 K/uL    Monos (Absolute) 0.56 0.00 - 0.85 K/uL    Eos (Absolute) 0.09 0.00 - 0.51 K/uL    Baso (Absolute) 0.00 0.00 - 0.12 K/uL    NRBC (Absolute) 0.00 K/uL   Complete Metabolic Panel (CMP)   Result Value Ref Range    Sodium 142 135 - 145 mmol/L    Potassium 2.9 (L) 3.6 - 5.5 mmol/L    Chloride 105 96 - 112 mmol/L    Co2 20 20 - 33 mmol/L    Anion Gap 17.0 (H) 7.0 - 16.0    Glucose 127 (H) 65 - 99 mg/dL    Bun 18 8 - 22 mg/dL    Creatinine 1.29 0.50 - 1.40 mg/dL    Calcium 8.8 8.5 - 10.5 mg/dL    AST(SGOT) 29 12 - 45 U/L    ALT(SGPT) 29 2 - 50 U/L    Alkaline Phosphatase 62 30 - 99 U/L    Total Bilirubin 0.6 0.1 - 1.5 mg/dL    Albumin 4.1 3.2 - 4.9 g/dL    Total Protein  7.2 6.0 - 8.2 g/dL    Globulin 3.1 1.9 - 3.5 g/dL    A-G Ratio 1.3 g/dL   Troponins NOW   Result Value Ref Range    Troponin T 11 6 - 19 ng/L   Troponin   Result Value Ref Range    Troponin T 1271 (H) 6 - 19 ng/L   proBrain Natriuretic Peptide, NT   Result Value Ref Range    NT-proBNP 56 0 - 125 pg/mL   CBC With Differential   Result Value Ref Range    WBC 24.3 (H) 4.8 - 10.8 K/uL    RBC 4.48 (L) 4.70 - 6.10 M/uL    Hemoglobin 14.1 14.0 - 18.0 g/dL    Hematocrit 41.7 (L) 42.0 - 52.0 %    MCV 93.1 81.4 - 97.8 fL    MCH 31.5 27.0 - 33.0 pg    MCHC 33.8 33.7 - 35.3 g/dL    RDW 43.8 35.9 - 50.0 fL    Platelet Count 310 164 - 446 K/uL    MPV 9.6 9.0 - 12.9 fL    Neutrophils-Polys 77.90 (H) 44.00 - 72.00 %    Lymphocytes 16.10 (L) 22.00 - 41.00 %    Monocytes 4.20 0.00 - 13.40 %    Eosinophils 0.20 0.00 - 6.90 %    Basophils 0.50 0.00 - 1.80 %    Immature Granulocytes 1.10 (H) 0.00 - 0.90 %    Nucleated RBC 0.00 /100 WBC    Neutrophils (Absolute) 18.95 (H) 1.82 - 7.42 K/uL    Lymphs (Absolute) 3.91 1.00 - 4.80 K/uL    Monos (Absolute) 1.01 (H) 0.00 - 0.85 K/uL    Eos (Absolute) 0.06 0.00 - 0.51 K/uL    Baso (Absolute) 0.11 0.00 - 0.12 K/uL    Immature Granulocytes (abs) 0.27 (H) 0.00 - 0.11 K/uL    NRBC (Absolute) 0.00 K/uL   Comp Metabolic Panel   Result Value Ref Range    Sodium 141 135 - 145 mmol/L    Potassium 2.5 (LL) 3.6 - 5.5 mmol/L    Chloride 103 96 - 112 mmol/L    Co2 19 (L) 20 - 33 mmol/L    Anion Gap 19.0 (H) 7.0 - 16.0    Glucose 295 (H) 65 - 99 mg/dL    Bun 18 8 - 22 mg/dL    Creatinine 1.13 0.50 - 1.40 mg/dL    Calcium 7.6 (L) 8.5 - 10.5 mg/dL    AST(SGOT) 285 (H) 12 - 45 U/L    ALT(SGPT) 231 (H) 2 - 50 U/L    Alkaline Phosphatase 63 30 - 99 U/L    Total Bilirubin 0.8 0.1 - 1.5 mg/dL    Albumin 3.5 3.2 - 4.9 g/dL    Total Protein 6.3 6.0 - 8.2 g/dL    Globulin 2.8 1.9 - 3.5 g/dL    A-G Ratio 1.3 g/dL   Lipase   Result Value Ref Range    Lipase 35 11 - 82 U/L       INTERVENTIONS BY ME:  Medications    AMIODARONE HCL IN DEXTROSE 150-4.21 MG/100ML-% IV SOLN (has no administration in time range)   EPINEPHrine (Adrenaline) (ACLS IV PUSH DOSE) Syringe (1 mg Intravenous Given 12/29/22 1620)   rocuronium (ZEMURON) injection (100 mg Intravenous Given 12/29/22 1624)   propofol (DIPRIVAN) injection (100 mg Intravenous New Bag 12/29/22 1624)     Response: Improved.    Critical Care time 35 minutes not counting procedures.    Procedure:  Procedural Sedation    Emergent informed consent obtained.   Preprocedural history and physical:  See this H&P  NPO Status: Unknown due to acuity of illness.  Supplemental oxygen provided.  Monitoring: Pulse oximetry, cardiac and blood pressure  Medication: Propofol 20 mg IV.  Response: adequate sedation.  Complications: Cardioverted successfully, after 30 seconds the patient arrested, which is probably not related to sedation.  Patient did not return to baseline by transfer to cath lab  Total MD bedside time for sedation procedure: greater than 10 minutes.    Endotracheal Intubation Procedure    Indication for endotracheal intubation: Respiratory failure  Sedation: Propofol 80 mg IV  Paralytic: rocuronium 100 mg IV  Adjuncts: none.  Preoxygenated: by NC.  Equipment: Glide scope  Cricoid Pressure: Yes.  Number of attempts: 2  ETT location confirmed by:  Ascultation, calorimetric capnography, tube condensation  Adverse events: None.    Cardioversion Procedure Note    Indication: ventricular tachycardia    Consent: Unable to be obtained due to the emergent nature of this procedure.    Pre-Medication: propofol intravenously    Procedure: The patient was placed in the supine position and the chest area was exposed. The cardioversion pads were applied in the standard manner and configuration.    Attempt #1: The defibrillator was set on the synchronous mode and charged to 200 joules.  A charge was then delivered which resulted in conversion to sinus tachycardia.    The patient tolerated the  procedure but 30 seconds later was in PEA.    Complications: See above.    CPR was also performed under my direction.    Discussions of Management:    4:30 PM - I discussed the patient's case and above findings with Dr. Carey (Cardiology) who agrees to take the patient to Cath lab. Furthermore case discussed with Dr. Graham (Intensivist) who agrees to assess the patient for hospitalization.     MEDICAL DECISION MAKING:  Differential Diagnosis:  STEMI vs V tach vs cardiogenic shock vs doubt PE     This patient presents with an acute anterior MI complicated by ventricular tachycardia and to transient periods of cardiac arrest requiring CPR.  He was cardioverted, sedated, and intubated by me in the emergency department.  He was admitted directly to the cardiac catheterization lab.  He was later found to be hyperkalemic but this was not apparent at the time he left the emergency department.  He likely has some degree of cardiogenic shock and a poor ejection fraction.    PLAN:  Admission to cardiac catheterization lab on admission to [facility] for medical management of MI, echocardiogram    CONDITION: Critical.     FINAL IMPRESSION  1. ST elevation myocardial infarction (STEMI), unspecified artery (HCC)    2. Cardiogenic shock (HCC)    3. Cardiac arrest (HCC)    4.      Critical care time 35 minutes.   5.      Conscious sedation.  6.      Cardioversion  7.       Intubation   8.       CPR     Guy INFANTE (Scribe), am scribing for, and in the presence of, Imtiaz Mccall M.D..    Electronically signed by: Guy Pack (Scribe), 12/29/2022    Imtiaz INFANTE M.D. personally performed the services described in this documentation, as scribed by Guy Pack in my presence, and it is both accurate and complete.    The note accurately reflects work and decisions made by me.  Imtiaz Mccall M.D.  12/29/2022  9:03 PM

## 2022-12-30 NOTE — PROGRESS NOTES
"Patient's BGL came back \"LOW\" took a second reading for confirmation came back \"LOW\" D-10 started per protocol   "

## 2022-12-30 NOTE — CODE DOCUMENTATION
Late Entry:    1728: RRT RN arrived at Cath Lab 4. Compressions in progress. 1mg epi being administered.    1730: ROSC achieved    1739: PEA, CPR resumed, 1mg epi given    1740: v-fib on zoll, shock delivered at 200J    1741: ROSC achieved    1742: V-fib on Zoll, shock delivered at 200J, ROSC achieved

## 2022-12-30 NOTE — DISCHARGE PLANNING
Care Transition Team Assessment  LSW met with patient's spouse at bedside. Emotional support provided. Spouse speaks English and Lebanese. Spouse, Estiven, reports her friend Ed is on the way to the hospital to translate and support her through this. Patient in critical condition. Spouse reports no needs and just wants to remain by patient's side. LSW provided contact information and will remain available for any needs.    Patient was independent prior to admission. Patient is a retired . Patient lives with spouse. They have no children. Patient has family (father, siblings) in [place].     LSW       Information Source  Orientation Level: Unable to assess  Information Given By: Spouse  Informant's Name: Estiven  Who is responsible for making decisions for patient? : Spouse    Readmission Evaluation  Is this a readmission?: No    Elopement Risk  Legal Hold: No  Ambulatory or Self Mobile in Wheelchair: No-Not an Elopement Risk  Elopement Risk: Not at Risk for Elopement    Discharge Preparedness  What is your plan after discharge?: Uncertain - pending medical team collaboration  What are your discharge supports?: Spouse, Parent, Sibling  Prior Functional Level: Independent with Activities of Daily Living, Independent with Medication Management  Difficulity with ADLs: None  Difficulity with IADLs: None    Functional Assesment  Prior Functional Level: Independent with Activities of Daily Living, Independent with Medication Management    Finances  Financial Barriers to Discharge: No  Prescription Coverage: Yes     Advance Directive  Advance Directive?: Clinically incapacitated / Inappropriate    Psychological Assessment  History of Substance Abuse: Alcohol  History of Psychiatric Problems: No  Non-compliant with Treatment: No  Newly Diagnosed Illness: Yes    Discharge Risks or Barriers  Discharge risks or barriers?: Complex medical needs    Anticipated Discharge Information  Discharge Disposition: Still a Patient  (30)  Discharge Address: [address] [place] [address]

## 2022-12-30 NOTE — PROGRESS NOTES
04:03: Dr. Rivers  and echo tech at bedside for impella reposition.    04:22 V-fib - defibrillated at 150J.     04:25 Dr. Rivers's impella reposition not successful. Requested cath lab be paged urgent and called in. ER charge notified. Dr. Rivers ordered impella rate to stay at 6.    05:25- Heparin therapy stopped    05:28 Critical lab - Hemo-10.4, LA-16.1, and K+-3.  notified on the way to cath lab with the patient.     06:45 Critical Lab - PTT greater than 240, patient currently in cath lab.

## 2022-12-30 NOTE — CONSULTS
"Cardiology Initial Consult Note    Date of note:    12/29/2022      Consulting Physician: Magdy Graham D.O.    Name:   Ramakrishna Ahuja   YOB: 1955  Age:   67 y.o.  male   MRN:   [id number]      Reason for Consultation: Code STEMI    HPI:  Ramakrishna Ahuja is a 67 y.o. male with past medical history of hypothyroidism who presented to the ER as STEMI alert.  Per report, patient was cleaning gutters when he had acute onset crushing substernal chest pain along with profuse diaphoresis.  Was found to be hypotensive in the field.  Upon arrival to the ER, patient became nauseous and had 1 bout of emesis.  Was given SL nitro which relieved chest pain.    While talking to him in the ER, patient went into VT with HR in 180s.  Was initially awake and alert but soon lost pulses and CPR was started.  Received IV propofol followed by cardioversion at 200 J along with 1 epi after which ROSC was achieved.  Received IV bolus amiodarone 150 mg.  Was urgently paralyzed and intubated by ERP.  Few minutes later, had PEA cardiac arrest and CPR was resumed.  Received 1 dose of epi and 2 rounds of CPR after which ROSC was achieved.      ROS  A complete ROS was performed and is negative except for pertinent positives mentioned in HPI.      Past Medical History:   Diagnosis Date    Alcohol use     \"3 drinks per day\"    Angina     occasional 6/10 on pain scale- none for 2 months/no evaluation    Chest pain 5/14/2012    Murmur 1978       Past Surgical History:   Procedure Laterality Date    INGUINAL HERNIA LAPAROSCOPIC BILATERAL  8/21/2013    Performed by Ovidio Jacob M.D. at [facility]    TONSILLECTOMY  1960         (Not in a hospital admission)    Current Facility-Administered Medications   Medication Dose Route Frequency Provider Last Rate Last Admin    AMIODARONE HCL IN DEXTROSE 150-4.21 MG/100ML-% IV SOLN             NS infusion   Intravenous Continuous Imtiaz Mccall M.D.        iohexol (OMNIPAQUE) 350 " mg/mL  1-350 mL Intravenous Once Elio Don M.D.        BIVALIRUDIN TRIFLUOROACETATE 250 MG IV SOLR             HEPARIN SODIUM (PORCINE) 1000 UNIT/ML INJ SOLN              Current Outpatient Medications   Medication Sig Dispense Refill    pravastatin (PRAVACHOL) 20 MG Tab Take 1 Tablet by mouth every evening. (Patient not taking: Reported on 12/29/2022) 90 Tablet 3    Misc Natural Products (COMPLETE PROSTATE HEALTH) TABS Take  by mouth.      Multiple Vitamin (MULTIVITAMIN) capsule Take  by mouth.           Allergies   Allergen Reactions    Penicillins          Family History   Problem Relation Age of Onset    Cancer Mother         breast cancer    Diabetes Other     Stroke Other     Cancer Other        Social History     Socioeconomic History    Marital status: Single     Spouse name: Not on file    Number of children: Not on file    Years of education: Not on file    Highest education level: Not on file   Occupational History    Not on file   Tobacco Use    Smoking status: Never    Smokeless tobacco: Never    Tobacco comments:     per pt, smoked approx 6 cigars ever, at parties occasionally   Substance and Sexual Activity    Alcohol use: Yes     Alcohol/week: 19.8 oz     Types: 28 Glasses of wine, 5 Cans of beer per week     Comment: 3 per day    Drug use: No    Sexual activity: Yes     Partners: Female   Other Topics Concern    Not on file   Social History Narrative    Not on file     Social Determinants of Health     Financial Resource Strain: Not on file   Food Insecurity: Not on file   Transportation Needs: Not on file   Physical Activity: Not on file   Stress: Not on file   Social Connections: Not on file   Intimate Partner Violence: Not on file   Housing Stability: Not on file       No intake or output data in the 24 hours ending 12/29/22 1707     Physical Exam  There is no height or weight on file to calculate BMI.  BP (!) 152/98   Pulse (!) 102   Resp (!) 25   SpO2 (!) 77%   Vitals:    12/29/22  1637 12/29/22 1638 12/29/22 1639 12/29/22 1640   BP: 100/53  (!) 152/98    Pulse: (!) 107 100 98 (!) 102   Resp: (!) 107 19 (!) 24 (!) 25   SpO2: (!) 80% (!) 79% (!) 79% (!) 77%     Oxygen Therapy:  Pulse Oximetry: (!) 77 %, O2 Delivery Device: Ventilator    General: Intubated and sedated  Eyes: nl conjunctiva  ENT: OP clear  Neck: JVP difficult to assess, no carotid bruits  Lungs: Mechanical breath sounds  Heart: RRR, no murmurs, no rubs or gallops, no edema bilateral lower extremities. No LV/RV heave on cardiac palpatation. 2+ bilateral radial pulses.  2+ bilateral dp pulses.   Abdomen: soft, non distended, no masses, normal bowel sounds.  No HSM.  Extremities/MSK: no clubbing, no cyanosis  Neurological: Unable to assess  Psychiatric: Unable to assess  Skin: Warm extremities with cool extremity tips      Labs (personally reviewed and notable for):   Lab Results   Component Value Date/Time    SODIUM 142 12/29/2022 04:16 PM    POTASSIUM 2.9 (L) 12/29/2022 04:16 PM    CHLORIDE 105 12/29/2022 04:16 PM    CO2 20 12/29/2022 04:16 PM    GLUCOSE 127 (H) 12/29/2022 04:16 PM    BUN 18 12/29/2022 04:16 PM    CREATININE 1.29 12/29/2022 04:16 PM      Lab Results   Component Value Date/Time    WBC 6.3 11/22/2016 02:03 PM    RBC 5.36 11/22/2016 02:03 PM    HEMOGLOBIN 17.1 11/22/2016 02:03 PM    HEMATOCRIT 49.8 11/22/2016 02:03 PM    MCV 92.9 11/22/2016 02:03 PM    MCH 31.9 11/22/2016 02:03 PM    MCHC 34.3 11/22/2016 02:03 PM    MPV 9.7 11/22/2016 02:03 PM    NEUTSPOLYS 46.40 11/22/2016 02:03 PM    LYMPHOCYTES 44.10 (H) 11/22/2016 02:03 PM    MONOCYTES 7.40 11/22/2016 02:03 PM    EOSINOPHILS 1.30 11/22/2016 02:03 PM    BASOPHILS 0.50 11/22/2016 02:03 PM      Lab Results   Component Value Date/Time    CHOLSTRLTOT 235 (H) 05/03/2017 09:21 AM     (H) 05/03/2017 09:21 AM    HDL 52 05/03/2017 09:21 AM    TRIGLYCERIDE 137 05/03/2017 09:21 AM       Lab Results   Component Value Date/Time    TROPONINT 11 12/29/2022 1616     No  results found for: NTPROBNP      Cardiac Imaging and Procedures Review:    EKG dated 12/29/2022: My personal interpretation is sinus rhythm with ST elevation in 1, aVL and anterior leads      Radiology test Review:  DX-CHEST-LIMITED (1 VIEW)   Final Result      1.  Mild diffuse soft tissue prominence which could indicate edema and/or infection.   2.  Bilateral basilar atelectasis and/or consolidation.   3.  Endotracheal tube terminates just above the level of the aortic arch in satisfactory position.   4.  Enteric feeding tube courses towards the stomach with the distal tip not visualized.      CL-LEFT HEART CATHETERIZATION WITH POSSIBLE INTERVENTION    (Results Pending)   CL-LEFT HEART CATHETERIZATION WITH POSSIBLE INTERVENTION    (Results Pending)   DX-CHEST-PORTABLE (1 VIEW)    (Results Pending)       Problem list:  Active Problems:    * No active hospital problems. *  Resolved Problems:    * No resolved hospital problems. *      Impression and Medical Decision Making:  #Cardiac arrest  #Anterior STEMI  #Acute respiratory failure with hypoxia    Recommendations:  -- Patient is critically ill.  -- I personally assisted ERP during code in terms of IV medications including IV amiodarone bolus and IV heparin bolus of 4000 units.  -- Patient was emergently taken to Cath Lab.  --Patient already loaded with aspirin 325 mg.  Initiate DAPT therapy after PCI for a minimum of 1 year.  --Start atorvastatin 80 mg daily  --Obtain transthoracic echocardiogram to evaluate cardiac structure and function.  --Obtain lipid panel and hemoglobin A1c.      Cardiology Critical Care Documentation    Patient is critically ill with a life threatening illness as noted above required my direct presence, involvement and maximal preparedness.  I have spent a total of 45 minutes providing care for this patient, independent of other providers time. No time overlap. Procedures were not included in this time. This time was spent at the bedside  evaluating the patient's chart, labs, imaging, physical exam, discussing with MD, RN and pharmacist, formulating assessment and plan.    Thank you for allowing me to participate in the care of this patient, cardiology will continue to follow.  Please contact me with any questions.      Darrian Carey M.D.  Cardiologist, [facility]   [phone]    This note was generated using voice recognition software which has a small chance of producing errors of grammar and possibly content. I have made every reasonable attempt to find and correct any obvious errors, but expect that some may not be found prior to finalization of this note.

## 2022-12-30 NOTE — DISCHARGE PLANNING
Spouse, friend Ed and her  Cameron at bedside to support. Ed provided interpretation services. Patient transitioned to comfort care. Difficult times packet given to family.

## 2022-12-30 NOTE — DISCHARGE SUMMARY
[facility] Critical Care Death Summary      Attending: Florina Kennedy M.d.  Senior Resident: Dr. Trinidad Coburn  Call Back Contact Number: [phone]    Admission Date  12/29/2022    Cause of Death  Cardiogenic shock due to severe acute left heart failure due to anterior wall ST elevation myocardial infarction.    Comorbid Conditions at the Time of Death  Principal Problem:    Cardiac arrest (HCC) POA: Unknown  Active Problems:    Cardiogenic shock (HCC) POA: Unknown    VT (ventricular tachycardia) POA: Unknown    CAD (coronary artery disease) POA: Unknown    Acute respiratory failure with hypoxia (HCC) POA: Unknown    Acute renal failure (ARF) (HCC) POA: Unknown    Abnormal LFTs POA: Unknown    Acute blood loss anemia POA: Unknown  Resolved Problems:    * No resolved hospital problems. *      History of Presenting Illness and Hospital Course  This is a 67 y.o. male admitted 12/29/2022 with anterior wall STEMI. In the ED, he went into pulseless VT, which achieved ROSC with defibrillation, and ACLS resuscitative efforts including epi and amiodarone bolus. He was emergently intubated in the ED, went into PEA arrest again, and required 2 rounds of CPR prior to achievement of ROSC. He was loaded with IV heparin and aspirin 325mg and prasugrel, and taken emergently to cath lab where LHC revealed 100% occlusion of proximal LAD, where he underwent PCI with AMANDA x 1 and RUBEN 3 flow. Unfortunately during his left heart cath, he had repeated cardiac arrest requiring ACLS, another amiodarone bolus, and 4 shocks. He had impella placed via right femoral artery, w/ findings of severe LHF LVEF 20% and admitted to the ICU on ionotropic support w/ epinephrine and additional vasopressor support with levophed. Early AM of 12/30, the impella was noted to be malpositioned and bedside attempt was trialed for 30 minutes by interventional cardiology with developement of torsades and VF requiring defibrillation. Patient was also noted to have a  right groin hematoma as well, but was noted to be neurovascularly intact distally. Pt had severe metabolic acidosis and lactic acidosis treated with bicarb infusion. Patient was taken back to cath lab for fluoroscopy guided impella adjustment, and was treated with IV fluids due to noting of low CVP and wedge pressures. Impella was advanced toward the LV apex successfully and swan karlene catheter replaced. On arrival back to the ICU, patient was on maximal support w/ impella on P8, phenylephrine, epi, and levophed. He was also on amio gtt for his recurrent VT/VF. Dobutamine was added as much as patient could tolerate, which was low dose. Impella support was increased to P9. Pt was on targeted temperature monitoring w/ hypothermia protocol for post-cardiac arrest neurological recovery. AM of 12/30, patient's family decided to make patient comfort care after education on his poor prognosis. With family at bedside, ionotropic support and Impella support was discontinued, IV morphine was given, and he passed away immediately and comfortably.     Consultants  cardiology and critical care    Procedures  12/29-- cardiac catheterization, coronary angiography, impella placement, intraprocedural ACLS, right heart catheterization, salvage PCI to proximal LAD with AMANDA x1 placed    12/29-- video EEG    12/30-- bedside ultrasound guided Impella repositioning, aborted    12/30-- Impella repositioning and replacement of Wilmer karlene catheter      Death Date: 12/30/22   Death Time: 1015         Pronounced By (RN1): Amanda Norton  Pronounced By (RN2): Andrea Millan

## 2022-12-30 NOTE — PROGRESS NOTES
Assumed care from Dr. Chung. Patient returned from cath lab after having impella repositioned.  I discussed the case with Dr. Rivers. The impella was successfully repositioned, as was the swan. CI is 1.4 and wedge/CVP were low. Patient arrived back in ICU.  On exam he had absent brainstem reflexes, was cold (temp ~31 with arctic sun targeting 33), R IJ swan, R groin impella without evidence of a growing hematoma, L groin appears to have been stuck without bleeding, absent distal pulses, clear lung, absent BS, [place] bag with dark urine c/w hemolysis.    Initial arterial line waveform showed almost absent pulse. I worked to optimize CO by increasing impella to P9 per Dr. Rivers, d/c propofol, increase epi, decrease adelso, and gave pushes of calcium and bicarb. I increased the bicarb rate to 250/hr for hypovolemia and acidosis. I decreased the amio rate to 0.5 given relative bradycardia. I trialed dobutamine, but his BP would only tolerate a rate of 2.5.  He responded best to the calcium pushes. He was hypoxic by ABG on 100% FiO2 so I increased the PEEP.  Pulse ox not reading well.  I changed his target temp to 36 as he was hypothermic to 31 and had relative bradycardia (60s) and coagulopathy.  Patient's wife arrived and I updated her regarding the clinical situation with patient's sister on speaker.  Patient and wife had never discussed goals of care previously.     I was only able to achieve a MAP of 55 despite very high pressor rates, e.g. epi up to 2mcg/kg/min.  Next set of labs returned and Hgb was down 4 points. Lacticc 17. Around that time impella started having suction alarms. I called for a red box.  Patient's wife, Estiven, then asked if I thought the transfusions would save his life.  I told her he was most likely going to die even with all our aggressive treatments, and she requested that we move to comfort measures at that time. Estiven's friend and friend's  were there with her for support. SW also  present for support.    Discussed with Dr. Rivers and Florencia prior to moving to [facility].  Patient then made comfortable and  quickly.    The patient was critically ill on vent, impella, vasopressors.  Critical care time = 105 minutes in directly providing and coordinating critical care and extensive data review.  No time overlap and excludes procedures.

## 2022-12-30 NOTE — PROGRESS NOTES
STEMI alert, Crushing chest pain and HOTN in field  Door time 1611    1617 patient in vtach 199 plant to cardiovert  1618 80 prop, shock delivered at 200 J     1618 PEA no pulse CPR started.     1620 1 epi  1621 pulse check, palpable pulse   EKG  1624 100 prop  1624 MIGUEL 100   1626 4000 heparin   1628 150 amiodarone    1633 PEA CPR started  1633 1 epi  1635 pulse check,   PEA no pulse  1637 pulse SR     1639 packing to go to cath lab

## 2022-12-30 NOTE — PROGRESS NOTES
Impella alerting suction alarms. Titrated down slowly. Not tolerating. Titrated down to P6. Alert Dr. Chung. CVP 8. Received orders for 1x bolus of  ml. Administered.    Pt becoming hypotensive with more ventricular ectopy. Rapidly increasing vasopressors. Dr. Chung alerted. Up to bedside. Recommended paging cardiology. Ordered ABG. Ordered to start titrating up on Epi.    0154: Paged on call cardiology. Dr. Miguel Dodd paged back. Updated on pt's status. Ordered another loading dose of Amio. Recommended paging Calvin (Impella Rep) for recommendations.    Paged Calvin. Updated on pt's status. Recommended echo to check for placement and repositioning if needed. Echo ordered stat. Tech called in.    Blood gases obtained. Dr. Chung ordered 2 amps bicarb push and a bicarb drip. Administered. Pt's vitals improved with bicarb pushes. Loading dose of amio administered.     0245- ECHO tech at bedside

## 2022-12-30 NOTE — PROCEDURES
Ultrasound Guided bedside Impella CP repositioning:    Paged by ICU team regarding concerns with Impella malposition.    Reviewed echocardiogram images obtained by the echocardiogram technician, and it showed that the Impella's pigtail tip is tucked under a papillary muscle.  Discussed with Impella rep on-call.    Attempted bedside echocardiographic guided repositioning for 28 minutes without success (Impella placed at P2 level during repositioning process).  During repositioning process, the patient developed Tdp / VF successfully cardioverted to normal sinus rhythm with 1 shock.    Further attempts were stopped.  Impella advanced back to 90 cm, set at P6 and locked in place.    Plan to attempt fluoroscopic guided Impella CP repositioning in the heart catheterization lab.    Addendum: a hematoma was noted when I first examined the patient before starting the above procedure, per nursing, been slowly developing since about 10pm. Warm feet with palpable left DP pulse. Nursing held manual pressure for about 15 min around the sheath insertion site after I was done with the procedure. By the time he got to the cath lab at 5:45am, the hematoma was reduced in size (still present) and was softer to palpation with still palpable right DP pulse.

## 2022-12-30 NOTE — PROGRESS NOTES
TOD 1015    Spouse present at bedside  Spouse sent home with earrings, necklace, keys and clothing

## 2022-12-30 NOTE — EEG PROGRESS NOTE
Arrived to inquire if STAT EEG could be started@1842.   RN Asked that return in 10-15min they would try to be ready. Notified .  2nd attempt @1915 RAZIA Traylor will volte when patient is available.

## 2022-12-30 NOTE — PROCEDURES
Procedure: Arterial Line, L radial    : Dr. Suresh    Attending physician: Dr. Graham    Reason: Pressors    Consent:  Emergent 2 physician consent    A time out was performed and the appropriate side was confirmed and marked. My hands were washed immediately prior to the procedure. I wore sterile gloves throughout the procedure. The patient was prepped and draped in a sterile manner using chlorhexidine scrub after the appropriate level was percussed and confirmed by ultrasound. After an Hermilo test was performed to ensure adequate perfusion, the L wrist was prepped using chlorhexidine scrub and draped in sterile fashion using a three quarter sheet drape and sterile towels. Anesthesia was achieved using 1% lidocaine. Using the Arrow Radial Arterial Line Kit, a needle was inserted into the radial artery. Arterial blood was seen to pulsate in the flash chamber. The internal guidewire was advanced easily into the radial artery. The catheter was then advanced over the wire and the needle and wire were withdrawn. The catheter was sutured in place. A sterile opsite was placed over the catheter at the insertion site. The patient tolerated the procedure without any hemodynamic compromise. At the time of procedure completion, the catheter was connected to the cardiac monitor and calibrated. Appropriate waveform and blood pressure tracing was observed.     Complications: none    Blood loss: < 3 cc

## 2022-12-30 NOTE — CARE PLAN
Vent Day 2    CMV 24, vt 440, peep 10, Fio2 60%      Problem: Ventilation  Goal: Ability to achieve and maintain unassisted ventilation or tolerate decreased levels of ventilator support  Description: Target End Date:  4 days     Document on Vent flowsheet    1.  Support and monitor invasive and noninvasive mechanical ventilation  2.  Monitor ventilator weaning response  3.  Perform ventilator associated pneumonia prevention interventions  4.  Manage ventilation therapy by monitoring diagnostic test results  Outcome: Not Met

## 2023-01-14 LAB — T4 FREE SERPL DIALY-MCNC: 15.8 NG/DL (ref 1.1–2.4)
